# Patient Record
Sex: FEMALE | Race: BLACK OR AFRICAN AMERICAN | Employment: FULL TIME | URBAN - METROPOLITAN AREA
[De-identification: names, ages, dates, MRNs, and addresses within clinical notes are randomized per-mention and may not be internally consistent; named-entity substitution may affect disease eponyms.]

---

## 2018-07-30 ENCOUNTER — HOSPITAL ENCOUNTER (OUTPATIENT)
Dept: GENERAL RADIOLOGY | Age: 53
Discharge: HOME | End: 2018-08-01
Payer: COMMERCIAL

## 2018-07-30 DIAGNOSIS — Z12.31 VISIT FOR SCREENING MAMMOGRAM: ICD-10-CM

## 2018-07-30 PROCEDURE — 77067 SCR MAMMO BI INCL CAD: CPT

## 2019-02-07 ENCOUNTER — HOSPITAL ENCOUNTER (EMERGENCY)
Dept: HOSPITAL 93 - ER | Age: 54
Discharge: HOME | End: 2019-02-07
Payer: COMMERCIAL

## 2019-02-07 VITALS — WEIGHT: 205 LBS | HEIGHT: 64 IN | BODY MASS INDEX: 35 KG/M2

## 2019-02-07 DIAGNOSIS — S82.891A: Primary | ICD-10-CM
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
## 2020-05-14 ENCOUNTER — APPOINTMENT (OUTPATIENT)
Dept: GENERAL RADIOLOGY | Age: 55
End: 2020-05-14
Payer: COMMERCIAL

## 2020-05-14 ENCOUNTER — HOSPITAL ENCOUNTER (OUTPATIENT)
Age: 55
Setting detail: OBSERVATION
Discharge: HOME | End: 2020-05-15
Attending: EMERGENCY MEDICINE | Admitting: INTERNAL MEDICINE
Payer: COMMERCIAL

## 2020-05-14 PROBLEM — F41.9 ANXIETY: Status: ACTIVE | Noted: 2020-05-14

## 2020-05-14 PROBLEM — I10 HYPERTENSION: Status: ACTIVE | Noted: 2020-05-14

## 2020-05-14 PROBLEM — R07.9 CHEST PAIN: Status: ACTIVE | Noted: 2020-05-14

## 2020-05-14 PROBLEM — G47.33 OSA (OBSTRUCTIVE SLEEP APNEA): Status: ACTIVE | Noted: 2020-05-14

## 2020-05-14 LAB
ALBUMIN SERPL-MCNC: 3.8 G/DL (ref 3.5–5.2)
ALP BLD-CCNC: 88 U/L (ref 35–104)
ALT SERPL-CCNC: 16 U/L (ref 0–32)
ANION GAP SERPL CALCULATED.3IONS-SCNC: 9 MMOL/L (ref 7–16)
AST SERPL-CCNC: 21 U/L (ref 0–31)
BASOPHILS ABSOLUTE: 0.06 E9/L (ref 0–0.2)
BASOPHILS RELATIVE PERCENT: 1.4 % (ref 0–2)
BILIRUB SERPL-MCNC: 0.3 MG/DL (ref 0–1.2)
BUN BLDV-MCNC: 11 MG/DL (ref 6–20)
CALCIUM SERPL-MCNC: 9.1 MG/DL (ref 8.6–10.2)
CHLORIDE BLD-SCNC: 99 MMOL/L (ref 98–107)
CO2: 28 MMOL/L (ref 22–29)
CREAT SERPL-MCNC: 0.7 MG/DL (ref 0.5–1)
D DIMER: <200 NG/ML DDU
EOSINOPHILS ABSOLUTE: 0.08 E9/L (ref 0.05–0.5)
EOSINOPHILS RELATIVE PERCENT: 1.9 % (ref 0–6)
GFR AFRICAN AMERICAN: >60
GFR NON-AFRICAN AMERICAN: >60 ML/MIN/1.73
GLUCOSE BLD-MCNC: 98 MG/DL (ref 74–99)
HBA1C MFR BLD: 5.8 % (ref 4–5.6)
HCT VFR BLD CALC: 38.9 % (ref 34–48)
HEMOGLOBIN: 12.6 G/DL (ref 11.5–15.5)
IMMATURE GRANULOCYTES #: 0.01 E9/L
IMMATURE GRANULOCYTES %: 0.2 % (ref 0–5)
LYMPHOCYTES ABSOLUTE: 2.04 E9/L (ref 1.5–4)
LYMPHOCYTES RELATIVE PERCENT: 48.8 % (ref 20–42)
MCH RBC QN AUTO: 29.2 PG (ref 26–35)
MCHC RBC AUTO-ENTMCNC: 32.4 % (ref 32–34.5)
MCV RBC AUTO: 90 FL (ref 80–99.9)
MONOCYTES ABSOLUTE: 0.28 E9/L (ref 0.1–0.95)
MONOCYTES RELATIVE PERCENT: 6.7 % (ref 2–12)
NEUTROPHILS ABSOLUTE: 1.71 E9/L (ref 1.8–7.3)
NEUTROPHILS RELATIVE PERCENT: 41 % (ref 43–80)
PDW BLD-RTO: 13.2 FL (ref 11.5–15)
PLATELET # BLD: 357 E9/L (ref 130–450)
PMV BLD AUTO: 9.9 FL (ref 7–12)
POTASSIUM SERPL-SCNC: 4.4 MMOL/L (ref 3.5–5)
PRO-BNP: 66 PG/ML (ref 0–125)
RBC # BLD: 4.32 E12/L (ref 3.5–5.5)
SODIUM BLD-SCNC: 136 MMOL/L (ref 132–146)
TOTAL PROTEIN: 7.3 G/DL (ref 6.4–8.3)
TROPONIN: <0.01 NG/ML (ref 0–0.03)
TROPONIN: <0.01 NG/ML (ref 0–0.03)
WBC # BLD: 4.2 E9/L (ref 4.5–11.5)

## 2020-05-14 PROCEDURE — 85378 FIBRIN DEGRADE SEMIQUANT: CPT

## 2020-05-14 PROCEDURE — 2580000003 HC RX 258: Performed by: INTERNAL MEDICINE

## 2020-05-14 PROCEDURE — 2580000003 HC RX 258: Performed by: EMERGENCY MEDICINE

## 2020-05-14 PROCEDURE — G0378 HOSPITAL OBSERVATION PER HR: HCPCS

## 2020-05-14 PROCEDURE — 6370000000 HC RX 637 (ALT 250 FOR IP): Performed by: EMERGENCY MEDICINE

## 2020-05-14 PROCEDURE — 83880 ASSAY OF NATRIURETIC PEPTIDE: CPT

## 2020-05-14 PROCEDURE — 6360000002 HC RX W HCPCS: Performed by: EMERGENCY MEDICINE

## 2020-05-14 PROCEDURE — 36415 COLL VENOUS BLD VENIPUNCTURE: CPT

## 2020-05-14 PROCEDURE — 84484 ASSAY OF TROPONIN QUANT: CPT

## 2020-05-14 PROCEDURE — 80053 COMPREHEN METABOLIC PANEL: CPT

## 2020-05-14 PROCEDURE — 83036 HEMOGLOBIN GLYCOSYLATED A1C: CPT

## 2020-05-14 PROCEDURE — 85025 COMPLETE CBC W/AUTO DIFF WBC: CPT

## 2020-05-14 PROCEDURE — 96374 THER/PROPH/DIAG INJ IV PUSH: CPT

## 2020-05-14 PROCEDURE — 96372 THER/PROPH/DIAG INJ SC/IM: CPT

## 2020-05-14 PROCEDURE — 6360000002 HC RX W HCPCS: Performed by: INTERNAL MEDICINE

## 2020-05-14 PROCEDURE — 99285 EMERGENCY DEPT VISIT HI MDM: CPT

## 2020-05-14 PROCEDURE — 71045 X-RAY EXAM CHEST 1 VIEW: CPT

## 2020-05-14 PROCEDURE — 94761 N-INVAS EAR/PLS OXIMETRY MLT: CPT

## 2020-05-14 PROCEDURE — 93005 ELECTROCARDIOGRAM TRACING: CPT | Performed by: EMERGENCY MEDICINE

## 2020-05-14 RX ORDER — ESCITALOPRAM OXALATE 10 MG/1
10 TABLET ORAL DAILY
Status: DISCONTINUED | OUTPATIENT
Start: 2020-05-15 | End: 2020-05-15 | Stop reason: HOSPADM

## 2020-05-14 RX ORDER — VALSARTAN 320 MG/1
320 TABLET ORAL DAILY
Status: DISCONTINUED | OUTPATIENT
Start: 2020-05-15 | End: 2020-05-15 | Stop reason: HOSPADM

## 2020-05-14 RX ORDER — ACETAMINOPHEN 500 MG
1000 TABLET ORAL ONCE
Status: COMPLETED | OUTPATIENT
Start: 2020-05-14 | End: 2020-05-14

## 2020-05-14 RX ORDER — ACETAMINOPHEN 325 MG/1
650 TABLET ORAL EVERY 6 HOURS PRN
Status: DISCONTINUED | OUTPATIENT
Start: 2020-05-14 | End: 2020-05-15 | Stop reason: HOSPADM

## 2020-05-14 RX ORDER — NITROGLYCERIN 0.4 MG/1
0.4 TABLET SUBLINGUAL EVERY 5 MIN PRN
Status: DISCONTINUED | OUTPATIENT
Start: 2020-05-14 | End: 2020-05-15 | Stop reason: HOSPADM

## 2020-05-14 RX ORDER — VALSARTAN AND HYDROCHLOROTHIAZIDE 320; 12.5 MG/1; MG/1
1 TABLET, FILM COATED ORAL DAILY
COMMUNITY

## 2020-05-14 RX ORDER — HYDROCHLOROTHIAZIDE 12.5 MG/1
12.5 TABLET ORAL DAILY
Status: DISCONTINUED | OUTPATIENT
Start: 2020-05-15 | End: 2020-05-15 | Stop reason: HOSPADM

## 2020-05-14 RX ORDER — PROMETHAZINE HYDROCHLORIDE 25 MG/1
12.5 TABLET ORAL EVERY 6 HOURS PRN
Status: DISCONTINUED | OUTPATIENT
Start: 2020-05-14 | End: 2020-05-15 | Stop reason: HOSPADM

## 2020-05-14 RX ORDER — DEXTROSE MONOHYDRATE 25 G/50ML
12.5 INJECTION, SOLUTION INTRAVENOUS PRN
Status: DISCONTINUED | OUTPATIENT
Start: 2020-05-14 | End: 2020-05-15 | Stop reason: HOSPADM

## 2020-05-14 RX ORDER — ESCITALOPRAM OXALATE 10 MG/1
20 TABLET ORAL DAILY
COMMUNITY

## 2020-05-14 RX ORDER — NICOTINE POLACRILEX 4 MG
15 LOZENGE BUCCAL PRN
Status: DISCONTINUED | OUTPATIENT
Start: 2020-05-14 | End: 2020-05-15 | Stop reason: HOSPADM

## 2020-05-14 RX ORDER — ONDANSETRON 2 MG/ML
4 INJECTION INTRAMUSCULAR; INTRAVENOUS EVERY 6 HOURS PRN
Status: DISCONTINUED | OUTPATIENT
Start: 2020-05-14 | End: 2020-05-15 | Stop reason: HOSPADM

## 2020-05-14 RX ORDER — POLYETHYLENE GLYCOL 3350 17 G/17G
17 POWDER, FOR SOLUTION ORAL DAILY PRN
Status: DISCONTINUED | OUTPATIENT
Start: 2020-05-14 | End: 2020-05-15 | Stop reason: HOSPADM

## 2020-05-14 RX ORDER — ERGOCALCIFEROL 1.25 MG/1
50000 CAPSULE ORAL WEEKLY
COMMUNITY

## 2020-05-14 RX ORDER — SODIUM CHLORIDE 0.9 % (FLUSH) 0.9 %
10 SYRINGE (ML) INJECTION PRN
Status: DISCONTINUED | OUTPATIENT
Start: 2020-05-14 | End: 2020-05-15 | Stop reason: HOSPADM

## 2020-05-14 RX ORDER — SODIUM CHLORIDE 0.9 % (FLUSH) 0.9 %
10 SYRINGE (ML) INJECTION EVERY 12 HOURS SCHEDULED
Status: DISCONTINUED | OUTPATIENT
Start: 2020-05-14 | End: 2020-05-15 | Stop reason: HOSPADM

## 2020-05-14 RX ORDER — ASPIRIN 81 MG/1
81 TABLET, CHEWABLE ORAL DAILY
Status: DISCONTINUED | OUTPATIENT
Start: 2020-05-15 | End: 2020-05-15 | Stop reason: HOSPADM

## 2020-05-14 RX ORDER — KETOROLAC TROMETHAMINE 30 MG/ML
15 INJECTION, SOLUTION INTRAMUSCULAR; INTRAVENOUS ONCE
Status: COMPLETED | OUTPATIENT
Start: 2020-05-14 | End: 2020-05-14

## 2020-05-14 RX ORDER — ASPIRIN 325 MG
325 TABLET ORAL ONCE
Status: COMPLETED | OUTPATIENT
Start: 2020-05-14 | End: 2020-05-14

## 2020-05-14 RX ORDER — 0.9 % SODIUM CHLORIDE 0.9 %
500 INTRAVENOUS SOLUTION INTRAVENOUS ONCE
Status: COMPLETED | OUTPATIENT
Start: 2020-05-14 | End: 2020-05-14

## 2020-05-14 RX ORDER — DEXTROSE MONOHYDRATE 50 MG/ML
100 INJECTION, SOLUTION INTRAVENOUS PRN
Status: DISCONTINUED | OUTPATIENT
Start: 2020-05-14 | End: 2020-05-15 | Stop reason: HOSPADM

## 2020-05-14 RX ORDER — ACETAMINOPHEN 650 MG/1
650 SUPPOSITORY RECTAL EVERY 6 HOURS PRN
Status: DISCONTINUED | OUTPATIENT
Start: 2020-05-14 | End: 2020-05-15 | Stop reason: HOSPADM

## 2020-05-14 RX ORDER — VALSARTAN AND HYDROCHLOROTHIAZIDE 320; 12.5 MG/1; MG/1
1 TABLET, FILM COATED ORAL DAILY
Status: DISCONTINUED | OUTPATIENT
Start: 2020-05-15 | End: 2020-05-14 | Stop reason: CLARIF

## 2020-05-14 RX ORDER — NITROGLYCERIN 0.4 MG/1
0.4 TABLET SUBLINGUAL ONCE
Status: COMPLETED | OUTPATIENT
Start: 2020-05-14 | End: 2020-05-14

## 2020-05-14 RX ADMIN — NITROGLYCERIN 0.4 MG: 0.4 TABLET, ORALLY DISINTEGRATING SUBLINGUAL at 13:02

## 2020-05-14 RX ADMIN — KETOROLAC TROMETHAMINE 15 MG: 30 INJECTION, SOLUTION INTRAMUSCULAR at 14:44

## 2020-05-14 RX ADMIN — ASPIRIN 325 MG: 325 TABLET, FILM COATED ORAL at 17:02

## 2020-05-14 RX ADMIN — ACETAMINOPHEN 1000 MG: 500 TABLET ORAL at 17:00

## 2020-05-14 RX ADMIN — SODIUM CHLORIDE 500 ML: 9 INJECTION, SOLUTION INTRAVENOUS at 13:29

## 2020-05-14 RX ADMIN — Medication 10 ML: at 21:04

## 2020-05-14 RX ADMIN — ENOXAPARIN SODIUM 40 MG: 40 INJECTION SUBCUTANEOUS at 21:04

## 2020-05-14 ASSESSMENT — PAIN DESCRIPTION - ORIENTATION: ORIENTATION: LEFT

## 2020-05-14 ASSESSMENT — PAIN DESCRIPTION - LOCATION: LOCATION: CHEST

## 2020-05-14 ASSESSMENT — PAIN DESCRIPTION - DESCRIPTORS: DESCRIPTORS: PRESSURE;TIGHTNESS

## 2020-05-14 ASSESSMENT — PAIN SCALES - GENERAL
PAINLEVEL_OUTOF10: 0
PAINLEVEL_OUTOF10: 2
PAINLEVEL_OUTOF10: 1
PAINLEVEL_OUTOF10: 2
PAINLEVEL_OUTOF10: 8
PAINLEVEL_OUTOF10: 0

## 2020-05-14 ASSESSMENT — PAIN DESCRIPTION - PROGRESSION: CLINICAL_PROGRESSION: GRADUALLY WORSENING

## 2020-05-14 ASSESSMENT — PAIN DESCRIPTION - FREQUENCY: FREQUENCY: CONTINUOUS

## 2020-05-14 NOTE — ED PROVIDER NOTES
HPI:  5/14/20,   Time: 12:42 PM EDT       Antonio Dozier is a 48 y.o. female presenting to the ED for chest pain, beginning 16 hours ago. The complaint has been intermittent, mild in severity, and worsened by nothing. Patient states that beginning yesterday afternoon she is been having waxing waning left-sided chest pressure. She states she is had no radicular symptoms. No shortness of breath. There is no position that seems to make this worse. She is had no fevers or chills. Contrary to nursing notes she has not had chills. She states she had a chronic dry cough for months which is her seasonal allergies. She has no known sick contacts. She denies any leg swelling or leg pain. No family history of heart disease. Non-smoker she does have a family history of diabetes. She denies a personal history of hypercholesterolemia or diabetes  Or cardiac issues. She is not had any recent travel. She describes the pain as a 2 out of 10. Describes it now as a \"odd twinge\" she does report a history of hypertension. Denies pleuritic pain at this time. She denies any abdominal pain no vomiting, nausea or diarrhea. Review of Systems:   Pertinent positives and negatives are stated within HPI, all other systems reviewed and are negative.          --------------------------------------------- PAST HISTORY ---------------------------------------------  Past Medical History:  has no past medical history on file. Past Surgical History:  has no past surgical history on file. Social History:  reports that she has never smoked. She has never used smokeless tobacco. She reports current alcohol use. She reports that she does not use drugs. Family History: family history is not on file. The patients home medications have been reviewed. Allergies: Patient has no known allergies.         ---------------------------------------------------PHYSICAL EXAM--------------------------------------    Constitutional/General: Alert and oriented x3, well appearing, non toxic in NAD; overweight  Head: Normocephalic and atraumatic  Eyes: PERRL, EOMI, conjunctive normal, sclera non icteric  Mouth: Oropharynx clear, handling secretions, no trismus, no asymmetry of the posterior oropharynx or uvular edema  Neck: Supple, full ROM, non tender to palpation in the midline, no stridor, no crepitus, no meningeal signs  Respiratory: Lungs clear to auscultation bilaterally, no wheezes, rales, or rhonchi. Not in respiratory distress; no tachypnea or accessory muscle use. Cardiovascular:  Regular rate. Regular rhythm. No murmurs, gallops, or rubs. 2+ distal pulses  Chest: No chest wall tenderness  GI:  Abdomen Soft, Non tender, Non distended. +BS. No organomegaly, no palpable masses,  No rebound, guarding, or rigidity. Musculoskeletal: Moves all extremities x 4. Warm and well perfused, no clubbing, cyanosis, or edema. Capillary refill <3 seconds  Integument: skin warm and dry. No rashes. Lymphatic: no lymphadenopathy noted  Neurologic: GCS 15, no focal deficits, symmetric strength 5/5 in the upper and lower extremities bilaterally  Psychiatric: Normal Affect    -------------------------------------------------- RESULTS -------------------------------------------------  I have personally reviewed all laboratory and imaging results for this patient. Results are listed below.      LABS:  Results for orders placed or performed during the hospital encounter of 05/14/20   CBC Auto Differential   Result Value Ref Range    WBC 4.2 (L) 4.5 - 11.5 E9/L    RBC 4.32 3.50 - 5.50 E12/L    Hemoglobin 12.6 11.5 - 15.5 g/dL    Hematocrit 38.9 34.0 - 48.0 %    MCV 90.0 80.0 - 99.9 fL    MCH 29.2 26.0 - 35.0 pg    MCHC 32.4 32.0 - 34.5 %    RDW 13.2 11.5 - 15.0 fL    Platelets 034 849 - 128 E9/L    MPV 9.9 7.0 - 12.0 fL    Neutrophils % 41.0 (L) 43.0 - 80.0 %    Immature Granulocytes % 0.2 0.0 - Suspicious  +1       []   Highly Suspicious  +2    EK point: No ST deviation but LBBB, LVH repolarization changes (ex:digoxin);               2 points: ST deviation not due to LBBB, LVH or digoxin         [x]   Normal  0       []   Nonspecific Repolarization Disturbance  +1       []   Significant ST Depression  +2    AGE       []   <45  0       [x]   45-64  +1       []    >65  +2    RISK FACTORS:  1. HTN    2. Hypercholesterolemia    3. DM     4. Cigarette smoking (current or cessation < 3 mos)    5. Positive family history  (parent or sibling with CVD before age 72). 6. Obesity (BMI >30kg/m2)         []   No Risk factors Known  0       [x]   1-2 Risk Factors  +1       []   >3 Risk Factors or History of Atherosclerotic Disease  +2      INITIAL TROPONIN       [x]   < Normal Limit   0       []   1-3 x Normal Limit   +1       []   >3 x Normal Limit   +2     -----------------------------------------------------------------------------------------------------------------  SCORE TOTAL:  3 POINTS     Low Score          (0-3 Points), risk of MACE of 0.9-1.7% (discuss d/c home with f/u)  Mod Score          (4-6 Points), risk of MACE of 12-16.6% (discuss admission for further testing)  High Score         (7-10 Points), risk of MACE of 50-65% (Admit ALL as they are candidates for early invasive measures)  ------------------------- NURSING NOTES AND VITALS REVIEWED ---------------------------   The nursing notes within the ED encounter and vital signs as below have been reviewed by myself. BP (!) 144/86   Pulse 58   Temp 98.7 °F (37.1 °C) (Oral)   Resp 16   Ht 5' 4\" (1.626 m)   Wt 220 lb (99.8 kg)   SpO2 98%   BMI 37.76 kg/m²   Oxygen Saturation Interpretation: Normal    The patients available past medical records and past encounters were reviewed.         ------------------------------ ED COURSE/MEDICAL DECISION MAKING----------------------  Medications   aspirin tablet 325 mg (has no administration in time range)   acetaminophen (TYLENOL) tablet 1,000 mg (has no administration in time range)   nitroGLYCERIN (NITROSTAT) SL tablet 0.4 mg (0.4 mg Sublingual Given 5/14/20 1302)   0.9 % sodium chloride bolus (0 mLs Intravenous Stopped 5/14/20 1450)   ketorolac (TORADOL) injection 15 mg (15 mg Intravenous Given 5/14/20 1444)         ED COURSE:       Medical Decision Making:   Patient is a 57-year-old female that reports since yesterday afternoon she is been having left-sided chest pain. It does wax and wanes at times but is about a 2 out of 10 pain and has not fully gone away. She denies shortness of breath. No pleuritic pain. She denies any new cough or fevers. She does have a history of seasonal allergies. She denies cardiac history other than hypertension. Have EKG chest x-ray lab work including d-dimer troponin. We will try 1 dose of sublingual nitroglycerin to see if this relieves the pressure. Differential diagnosis includes ACS, MI, dysrhythmia, angina, anxiety, PE, GERD, musculoskeletal pain. Patient had a CBC that was normal chemistry that was normal troponin was negative BNP was normal d-dimer was negative chest x-ray was normal as well. She did have an EKG that showed normal sinus rhythm at 69 bpm no signs of any ST changes no ST elevations or depressions. However, she does have T wave inversions in 3 aVF V3 V4 and V5. I did call her PCP and attempt get an old EKG, as we did not have one here in the emergency room in our files. They did not have an old EKG for me to compare this to. Therefore, I did speak with Dr. Alison Childress from cardiology. He would prefer the patient stay for observation and stress test.  The hospitalist will admit the patient to their service. Patient is pain-free upon admission and was given an aspirin prior to admission. This patient has remained hemodynamically stable during their ED course. Re-Evaluations:             Re-evaluation.   Patients symptoms are

## 2020-05-14 NOTE — H&P
mouth daily   Yes Historical Provider, MD   vitamin D (ERGOCALCIFEROL) 1.25 MG (37587 UT) CAPS capsule Take 50,000 Units by mouth once a week Sundays   Yes Historical Provider, MD       Allergies:    Patient has no known allergies. Social History:    reports that she has never smoked. She has never used smokeless tobacco. She reports current alcohol use. She reports that she does not use drugs. Family History:   family history includes Colon Cancer in her mother; Diabetes in her father and mother; High Blood Pressure in her mother. PHYSICAL EXAM:  Vitals:  /66   Pulse 71   Temp 97.9 °F (36.6 °C) (Oral)   Resp 16   Ht 5' 4\" (1.626 m)   Wt 220 lb (99.8 kg)   SpO2 99%   BMI 37.76 kg/m²     General Appearance: alert and oriented to person, place and time and in no acute distress  Skin: warm and dry  Head: normocephalic and atraumatic  Eyes: pupils equal, round, and reactive to light, extraocular eye movements intact, conjunctivae normal  Neck: neck supple and non tender without mass   Pulmonary/Chest: clear to auscultation bilaterally- no wheezes, rales or rhonchi, normal air movement, no respiratory distress  Cardiovascular: normal rate, normal S1 and S2 and no carotid bruits  Abdomen: Obese, soft, non-tender, non-distended, normal bowel sounds, no masses or organomegaly  Extremities: no cyanosis, no clubbing and no edema  Neurologic: no cranial nerve deficit and speech normal        LABS:  Recent Labs     05/14/20  1315      K 4.4   CL 99   CO2 28   BUN 11   CREATININE 0.7   GLUCOSE 98   CALCIUM 9.1       Recent Labs     05/14/20  1315   WBC 4.2*   RBC 4.32   HGB 12.6   HCT 38.9   MCV 90.0   MCH 29.2   MCHC 32.4   RDW 13.2      MPV 9.9       No results for input(s): POCGLU in the last 72 hours.     CBC with Differential:    Lab Results   Component Value Date    WBC 4.2 05/14/2020    RBC 4.32 05/14/2020    HGB 12.6 05/14/2020    HCT 38.9 05/14/2020     05/14/2020    MCV 90.0

## 2020-05-15 ENCOUNTER — APPOINTMENT (OUTPATIENT)
Dept: NON INVASIVE DIAGNOSTICS | Age: 55
End: 2020-05-15
Payer: COMMERCIAL

## 2020-05-15 ENCOUNTER — APPOINTMENT (OUTPATIENT)
Dept: NUCLEAR MEDICINE | Age: 55
End: 2020-05-15
Payer: COMMERCIAL

## 2020-05-15 VITALS
WEIGHT: 222.19 LBS | OXYGEN SATURATION: 97 % | TEMPERATURE: 97.6 F | RESPIRATION RATE: 16 BRPM | HEART RATE: 63 BPM | BODY MASS INDEX: 37.93 KG/M2 | HEIGHT: 64 IN | DIASTOLIC BLOOD PRESSURE: 86 MMHG | SYSTOLIC BLOOD PRESSURE: 142 MMHG

## 2020-05-15 LAB
CHOLESTEROL, TOTAL: 162 MG/DL (ref 0–199)
EKG ATRIAL RATE: 69 BPM
EKG P AXIS: 49 DEGREES
EKG P-R INTERVAL: 158 MS
EKG Q-T INTERVAL: 394 MS
EKG QRS DURATION: 72 MS
EKG QTC CALCULATION (BAZETT): 422 MS
EKG R AXIS: 4 DEGREES
EKG T AXIS: -25 DEGREES
EKG VENTRICULAR RATE: 69 BPM
HDLC SERPL-MCNC: 47 MG/DL
LDL CHOLESTEROL CALCULATED: 100 MG/DL (ref 0–99)
LV EF: 70 %
LVEF MODALITY: NORMAL
TRIGL SERPL-MCNC: 77 MG/DL (ref 0–149)
TROPONIN: <0.01 NG/ML (ref 0–0.03)
TSH SERPL DL<=0.05 MIU/L-ACNC: 2.2 UIU/ML (ref 0.27–4.2)
VLDLC SERPL CALC-MCNC: 15 MG/DL

## 2020-05-15 PROCEDURE — 84443 ASSAY THYROID STIM HORMONE: CPT

## 2020-05-15 PROCEDURE — 36415 COLL VENOUS BLD VENIPUNCTURE: CPT

## 2020-05-15 PROCEDURE — 78452 HT MUSCLE IMAGE SPECT MULT: CPT

## 2020-05-15 PROCEDURE — 93016 CV STRESS TEST SUPVJ ONLY: CPT | Performed by: INTERNAL MEDICINE

## 2020-05-15 PROCEDURE — APPSS60 APP SPLIT SHARED TIME 46-60 MINUTES: Performed by: PHYSICIAN ASSISTANT

## 2020-05-15 PROCEDURE — 84484 ASSAY OF TROPONIN QUANT: CPT

## 2020-05-15 PROCEDURE — G0378 HOSPITAL OBSERVATION PER HR: HCPCS

## 2020-05-15 PROCEDURE — 93018 CV STRESS TEST I&R ONLY: CPT | Performed by: INTERNAL MEDICINE

## 2020-05-15 PROCEDURE — A9500 TC99M SESTAMIBI: HCPCS | Performed by: RADIOLOGY

## 2020-05-15 PROCEDURE — 6370000000 HC RX 637 (ALT 250 FOR IP): Performed by: INTERNAL MEDICINE

## 2020-05-15 PROCEDURE — 93017 CV STRESS TEST TRACING ONLY: CPT

## 2020-05-15 PROCEDURE — 2580000003 HC RX 258: Performed by: INTERNAL MEDICINE

## 2020-05-15 PROCEDURE — 93010 ELECTROCARDIOGRAM REPORT: CPT | Performed by: INTERNAL MEDICINE

## 2020-05-15 PROCEDURE — 3430000000 HC RX DIAGNOSTIC RADIOPHARMACEUTICAL: Performed by: RADIOLOGY

## 2020-05-15 PROCEDURE — 99239 HOSP IP/OBS DSCHRG MGMT >30: CPT | Performed by: FAMILY MEDICINE

## 2020-05-15 PROCEDURE — 99244 OFF/OP CNSLTJ NEW/EST MOD 40: CPT | Performed by: INTERNAL MEDICINE

## 2020-05-15 PROCEDURE — 80061 LIPID PANEL: CPT

## 2020-05-15 RX ADMIN — ESCITALOPRAM OXALATE 10 MG: 10 TABLET ORAL at 14:08

## 2020-05-15 RX ADMIN — HYDROCHLOROTHIAZIDE 12.5 MG: 12.5 TABLET ORAL at 14:08

## 2020-05-15 RX ADMIN — Medication 10 MILLICURIE: at 10:50

## 2020-05-15 RX ADMIN — Medication 30 MILLICURIE: at 12:13

## 2020-05-15 RX ADMIN — Medication 10 ML: at 09:24

## 2020-05-15 RX ADMIN — ASPIRIN 81 MG 81 MG: 81 TABLET ORAL at 14:08

## 2020-05-15 RX ADMIN — VALSARTAN 320 MG: 320 TABLET, FILM COATED ORAL at 14:08

## 2020-05-15 ASSESSMENT — PAIN SCALES - GENERAL: PAINLEVEL_OUTOF10: 0

## 2020-05-15 NOTE — PATIENT CARE CONFERENCE
[facility] Quality Flow/Interdisciplinary Rounds Progress Note        Quality Flow Rounds held on May 15, 2020    Disciplines Attending:  Bedside Nurse, ,  and Nursing Unit Leadership    Salena Edmondson was admitted on 5/14/2020 12:14 PM    Anticipated Discharge Date:  Expected Discharge Date: 05/17/20    Disposition:    Lorenzo Score:  Lorenzo Scale Score: 22    Readmission Score:         Discussed patient goal for the day, patient clinical progression, and barriers to discharge.   The following Goal(s) of the Day/Commitment(s) have been identified:  stress today       Jud Model  May 15, 2020

## 2020-05-15 NOTE — CONSULTS
Inpatient Cardiology Consultation      Reason for Consult:  Chest pain     Consulting Physician: Dr Bulmaro العراقي     Requesting Physician:  Dr Willam Marie     Date of Consultation: 5/15/2020    HISTORY OF PRESENT ILLNESS:   Ms Ashwini Callahan is a 48 to [facility] [employer] female who is not previously known to [address] [place] [address]. She just moved to the area approximately 3 years ago and does not follow with a Primary Care Provider yet here. She previously followed with [facility] and  [facility]. She has a PMH of HTN, anxiety/depression, Pat-en-Y surgery 2004. [facility] 5/14/2020 12:09 for chest pain. VS 97.8-68-16-99%RA-110/60  WBC 4.2, H&H 12.6/38.9,    Na 136, K 4.4, Cl 99, CO2 28, BUN/SCr 11/0.7, Glucose 98   Troponin < 0.01, ProBNP 66  Ddimer < 200   CXR negative for acute findings   EKG SR 69, non specific T wave changes, anterior leads, inferior leads (no previous EKG to compare to)     She states her chest pain started Wednesday 5/13/2020 in the evening. Describes as tightness that was constant. It did not change with exertion. She denies SOB or palpitations with it. She was able to sleep through the night Wednesday but when she got up Thursday morning 5/14/2020, the tightness was still there and worse. She locates it mid sternal with radiation to L side of face and L scapula. She denies change in pain with exertion. She decided to come to the ED since the tightness was not changing. She was given SL NG x1 and 15mg Toradol in the ED with relief of the pain. She is not sure which one helped the pain dissipate, but it took about 30 minutes after she was given the medicine for it to get better. She was admitted to a telemetry monitoring floor. Overnight she states that she had 3 occurrences of the chest tightness, the most recent being at 345am. It lasted about 15 minutes and then dissipated. She was not given any medications for the pain at this time. She is a life long non smoker.  She thinks she had a stress test years ago and it was negative (no records available). Currently she denies any chest pain, SOB, palpitations, lightheadedness/dizziness, orthopnea, PND. She has been having diarrhea for the last 2 days with associated sore throat and dry cough, which she attributed to allergies. Please note: past medical records were reviewed per electronic medical record (EMR) - see detailed reports under Past Medical/ Surgical History. Past Medical History:    1. Anxiety/depression   2. HTN  3. Previous EKG 2012 [facility] with T wave abnormalities noted (not available to view)   4. H/o c section, Pat-en-Y surgery 2004    Medications Prior to admit:  Prior to Admission medications    Medication Sig Start Date End Date Taking?  Authorizing Provider   escitalopram (LEXAPRO) 10 MG tablet Take 20 mg by mouth daily    Yes Historical Provider, MD   valsartan-hydrochlorothiazide (DIOVAN-HCT) 320-12.5 MG per tablet Take 1 tablet by mouth daily   Yes Historical Provider, MD   vitamin D (ERGOCALCIFEROL) 1.25 MG (08210 UT) CAPS capsule Take 50,000 Units by mouth once a week Sundays   Yes Historical Provider, MD       Current Medications:    Current Facility-Administered Medications: escitalopram (LEXAPRO) tablet 10 mg, 10 mg, Oral, Daily  sodium chloride flush 0.9 % injection 10 mL, 10 mL, Intravenous, 2 times per day  sodium chloride flush 0.9 % injection 10 mL, 10 mL, Intravenous, PRN  acetaminophen (TYLENOL) tablet 650 mg, 650 mg, Oral, Q6H PRN **OR** acetaminophen (TYLENOL) suppository 650 mg, 650 mg, Rectal, Q6H PRN  polyethylene glycol (GLYCOLAX) packet 17 g, 17 g, Oral, Daily PRN  promethazine (PHENERGAN) tablet 12.5 mg, 12.5 mg, Oral, Q6H PRN **OR** ondansetron (ZOFRAN) injection 4 mg, 4 mg, Intravenous, Q6H PRN  enoxaparin (LOVENOX) injection 40 mg, 40 mg, Subcutaneous, Daily  nitroGLYCERIN (NITROSTAT) SL tablet 0.4 mg, 0.4 mg, Sublingual, Q5 Min PRN  aspirin chewable tablet 81 mg, 81 mg, Oral, Daily  glucose (GLUTOSE) 40 % oral gel 15 g, 15 g, Oral, PRN  dextrose 50 % IV solution, 12.5 g, Intravenous, PRN  glucagon (rDNA) injection 1 mg, 1 mg, Intramuscular, PRN  dextrose 5 % solution, 100 mL/hr, Intravenous, PRN  valsartan (DIOVAN) tablet 320 mg, 320 mg, Oral, Daily **AND** hydrochlorothiazide (HYDRODIURIL) tablet 12.5 mg, 12.5 mg, Oral, Daily    Allergies:  Patient has no known allergies. Social History: Activity Level:1 story home; denies NAVAS at baseline 1  Work Hx: NA   Tobacco Hx: Denies   Alcohol Hx: Socially   Drug Dx: Denies   Caffeine Intake: Coffee, teas, soda, water   Code status: Full code     Family History: Jorje Chang age 67, HTN, DM   Father, living age 68, HTN, DM  Son, no known cardiac conditions     REVIEW OF SYSTEMS:     · Constitutional: Denies fevers, chills, night sweats, and fatigue  · HEENT: Denies headaches, nose bleeds, and blurred vision,oral pain, abscess or lesion. · Musculoskeletal: Denies falls, pain to BLE with ambulation and edema to BLE. · Neurological: Denies dizziness and lightheadedness, numbness and tingling  · Cardiovascular: Admits chest pain (see HPI) Denies palpitations, and feelings of heart racing. · Respiratory: Denies orthopnea and PND  · Gastrointestinal: Admits diarrhea (see HPI) Denies heartburn, nausea/vomiting,and constipation, black/bloody, and tarry stools. · Genitourinary: Denies dysuria and hematuria  · Hematologic: Denies excessive bruising or bleeding  · Lymphatic: Denies lumps and bumps to neck, axilla, breast, and groin    PHYSICAL EXAM:   /72   Pulse 61   Temp 97.6 °F (36.4 °C) (Oral)   Resp 16   Ht 5' 4\" (1.626 m)   Wt 222 lb 3 oz (100.8 kg)   SpO2 97%   BMI 38.14 kg/m²   CONST:  Well developed,  female who appears her stated age.  Awake, alert, cooperative, no apparent distress  HEENT:   Head- Normocephalic, atraumatic   Eyes- Conjunctivae pink, anicteric  Throat- Oral mucosa pink and moist  Neck-  No stridor, trachea no lower extremity edema  Neurologic: No focal motor deficits apparent, normal mood and affect    Telemetry: Sinus rhythm  EKG: Sinus rhythm 69 bpm.  Normal axis normal intervals. T wave inversions V3 through V6. Nonspecific inferior T wave changes. No prior for comparison. Impression:   1. Chest pain and borderline EKG changes. Troponin negative, no evidence of ACS. 2. Hypertension  3. Comorbid disease: Obesity (BMI 38.2 kg/m²), history of Pat-en-Y gastric bypass, anxiety/depression    Recommendations:  Given risk factors and EKG changes, ischemic evaluation is warranted.  Exercise nuclear stress test today   Aggressive risk factor modification   If no evidence of significant ischemia, can be discharged from my standpoint and follow-up as an outpatient    Thank you for the consultation. Please do not hesitate to call with questions.     Kalpana Simental MD  [facility]) Cardiology

## 2020-05-15 NOTE — PROCEDURES
Exercise Nuclear Stress Test    Date: 5/15/2020    Indication: Chest pain    Description of procedure:    Protocol: Exercise stress SPECT myocardial perfusion imaging, Bartolo protocol    Baseline EKG: SB 58 bpm, normal axis/intervals. Nonspecific T wave changes diffusely. Baseline BP: 129/73 mmHg    Patient exercised for a total of 6 minutes, achieving a peak heart rate of 151 bpm which is 90% of the predicted maximum. Peak BP was 213/78 mmHg. Total workload achieved was 6.9 METs. Patient reported no chest pain. Stress EKG showed no evidence of ischemia, and no arrhythmias were noted. Radiotracer was injected at peak exercise. Impression:  1. No evidence of ischemia on exercise stress EKG. 2. Hypertensive blood pressure response to exercise. 3. Normal heart rate recovery. 4. Average functional capacity. 5. Nuclear images to be reported separately.     Hector Mcdaniel MD  [facility]) Cardiology

## 2020-05-19 ENCOUNTER — TELEPHONE (OUTPATIENT)
Dept: CARDIOLOGY CLINIC | Age: 55
End: 2020-05-19

## 2020-10-26 ENCOUNTER — HOSPITAL ENCOUNTER (OUTPATIENT)
Dept: GENERAL RADIOLOGY | Age: 55
Discharge: HOME | End: 2020-10-28
Payer: COMMERCIAL

## 2020-10-26 PROCEDURE — 77063 BREAST TOMOSYNTHESIS BI: CPT

## 2023-04-24 ENCOUNTER — HOSPITAL ENCOUNTER (EMERGENCY)
Age: 58
Discharge: HOME | End: 2023-04-24
Attending: EMERGENCY MEDICINE

## 2023-04-24 ENCOUNTER — APPOINTMENT (OUTPATIENT)
Dept: CT IMAGING | Age: 58
End: 2023-04-24

## 2023-04-24 VITALS
OXYGEN SATURATION: 98 % | SYSTOLIC BLOOD PRESSURE: 196 MMHG | TEMPERATURE: 98 F | DIASTOLIC BLOOD PRESSURE: 94 MMHG | HEART RATE: 79 BPM | RESPIRATION RATE: 15 BRPM

## 2023-04-24 DIAGNOSIS — I10 ESSENTIAL HYPERTENSION: ICD-10-CM

## 2023-04-24 DIAGNOSIS — M54.2 NECK PAIN: Primary | ICD-10-CM

## 2023-04-24 LAB
ALBUMIN SERPL-MCNC: 4.2 G/DL (ref 3.5–5.2)
ALP SERPL-CCNC: 125 U/L (ref 35–104)
ALT SERPL-CCNC: 30 U/L (ref 0–32)
ANION GAP SERPL CALCULATED.3IONS-SCNC: 14 MMOL/L (ref 7–16)
AST SERPL-CCNC: 40 U/L (ref 0–31)
BASOPHILS # BLD: 0.03 E9/L (ref 0–0.2)
BASOPHILS NFR BLD: 0.7 % (ref 0–2)
BILIRUB SERPL-MCNC: 0.3 MG/DL (ref 0–1.2)
BUN SERPL-MCNC: 12 MG/DL (ref 6–20)
CALCIUM SERPL-MCNC: 9.9 MG/DL (ref 8.6–10.2)
CHLORIDE SERPL-SCNC: 98 MMOL/L (ref 98–107)
CO2 SERPL-SCNC: 24 MMOL/L (ref 22–29)
CREAT SERPL-MCNC: 0.6 MG/DL (ref 0.5–1)
EOSINOPHIL # BLD: 0.05 E9/L (ref 0.05–0.5)
EOSINOPHIL NFR BLD: 1.1 % (ref 0–6)
ERYTHROCYTE [DISTWIDTH] IN BLOOD BY AUTOMATED COUNT: 13.4 FL (ref 11.5–15)
GLUCOSE SERPL-MCNC: 97 MG/DL (ref 74–99)
HCT VFR BLD AUTO: 42.2 % (ref 34–48)
HGB BLD-MCNC: 13.5 G/DL (ref 11.5–15.5)
IMM GRANULOCYTES # BLD: 0.01 E9/L
IMM GRANULOCYTES NFR BLD: 0.2 % (ref 0–5)
LYMPHOCYTES # BLD: 1.77 E9/L (ref 1.5–4)
LYMPHOCYTES NFR BLD: 40 % (ref 20–42)
MCH RBC QN AUTO: 28.8 PG (ref 26–35)
MCHC RBC AUTO-ENTMCNC: 32 % (ref 32–34.5)
MCV RBC AUTO: 90.2 FL (ref 80–99.9)
MONOCYTES # BLD: 0.35 E9/L (ref 0.1–0.95)
MONOCYTES NFR BLD: 7.9 % (ref 2–12)
NEUTROPHILS # BLD: 2.21 E9/L (ref 1.8–7.3)
NEUTS SEG NFR BLD: 50.1 % (ref 43–80)
PLATELET # BLD AUTO: 381 E9/L (ref 130–450)
PMV BLD AUTO: 10 FL (ref 7–12)
POTASSIUM SERPL-SCNC: 4.1 MMOL/L (ref 3.5–5)
PROT SERPL-MCNC: 8 G/DL (ref 6.4–8.3)
RBC # BLD AUTO: 4.68 E12/L (ref 3.5–5.5)
SODIUM SERPL-SCNC: 136 MMOL/L (ref 132–146)
WBC # BLD: 4.4 E9/L (ref 4.5–11.5)

## 2023-04-24 PROCEDURE — 99285 EMERGENCY DEPT VISIT HI MDM: CPT

## 2023-04-24 PROCEDURE — 6360000004 HC RX CONTRAST MEDICATION: Performed by: RADIOLOGY

## 2023-04-24 PROCEDURE — 72125 CT NECK SPINE W/O DYE: CPT

## 2023-04-24 PROCEDURE — 93005 ELECTROCARDIOGRAM TRACING: CPT | Performed by: PHYSICIAN ASSISTANT

## 2023-04-24 PROCEDURE — 70450 CT HEAD/BRAIN W/O DYE: CPT

## 2023-04-24 PROCEDURE — 85025 COMPLETE CBC W/AUTO DIFF WBC: CPT

## 2023-04-24 PROCEDURE — 96374 THER/PROPH/DIAG INJ IV PUSH: CPT

## 2023-04-24 PROCEDURE — 80053 COMPREHEN METABOLIC PANEL: CPT

## 2023-04-24 PROCEDURE — 70498 CT ANGIOGRAPHY NECK: CPT

## 2023-04-24 PROCEDURE — 2500000003 HC RX 250 WO HCPCS: Performed by: EMERGENCY MEDICINE

## 2023-04-24 RX ORDER — TIZANIDINE 4 MG/1
4 TABLET ORAL 3 TIMES DAILY PRN
Qty: 30 TABLET | Refills: 0 | Status: SHIPPED | OUTPATIENT
Start: 2023-04-24

## 2023-04-24 RX ORDER — ENALAPRILAT 2.5 MG/2ML
1.25 INJECTION INTRAVENOUS ONCE
Status: COMPLETED | OUTPATIENT
Start: 2023-04-24 | End: 2023-04-24

## 2023-04-24 RX ADMIN — IOPAMIDOL 60 ML: 755 INJECTION, SOLUTION INTRAVENOUS at 16:25

## 2023-04-24 RX ADMIN — ENALAPRILAT 1.25 MG: 1.25 INJECTION INTRAVENOUS at 17:02

## 2023-04-24 ASSESSMENT — ENCOUNTER SYMPTOMS
VOMITING: 0
NAUSEA: 0
CHEST TIGHTNESS: 0
DIARRHEA: 0
SINUS PRESSURE: 0
ABDOMINAL DISTENTION: 0
BACK PAIN: 0
SORE THROAT: 0
COUGH: 0
SHORTNESS OF BREATH: 0
COLOR CHANGE: 0
SINUS PAIN: 0
CONSTIPATION: 0
ABDOMINAL PAIN: 0

## 2023-04-24 NOTE — ED NOTES
Reviewed discharge instructions with patient, discussed medications and addressed all patient and family questions/concerns. Pt verbalizes understanding.        Alissa Spears RN  04/24/23 0167

## 2023-04-24 NOTE — ED PROVIDER NOTES
The history is provided by the patient and medical records. 56-year-old female presents emergency department with complaint of left-sided neck pain going on for the last 3 to 4 days that radiates down her left arm. Denies any chest pain shortness breath denies any diaphoresis any nausea vomiting fever chills cough congestion runny nose no other acute complaints. Has some slight pain at this time. She states he also gets some slight numbness that happens that goes down that arm. Does not have any other symptoms now. Only complains of left upper arm pain at this time. Otherwise no other acute complaints. Review of Systems   Constitutional:  Negative for chills, fatigue and fever. HENT:  Negative for congestion, sinus pressure, sinus pain and sore throat. Respiratory:  Negative for cough, chest tightness and shortness of breath. Cardiovascular:  Negative for chest pain and leg swelling. Gastrointestinal:  Negative for abdominal distention, abdominal pain, constipation, diarrhea, nausea and vomiting. Endocrine: Negative for polyuria. Genitourinary:  Negative for difficulty urinating, dysuria, frequency, hematuria, urgency, vaginal bleeding and vaginal discharge. Musculoskeletal:  Positive for neck pain. Negative for arthralgias and back pain. Skin:  Negative for color change and pallor. Neurological:  Negative for dizziness and weakness. Physical Exam  Vitals and nursing note reviewed. Constitutional:       Appearance: Normal appearance. She is normal weight. HENT:      Head: Normocephalic and atraumatic. Eyes:      Conjunctiva/sclera: Conjunctivae normal.   Cardiovascular:      Rate and Rhythm: Normal rate and regular rhythm. Pulses: Normal pulses. Heart sounds: Normal heart sounds. No murmur heard. No gallop. Pulmonary:      Effort: Pulmonary effort is normal. No respiratory distress. Breath sounds: Normal breath sounds. No wheezing or rales.

## 2023-04-25 LAB
EKG ATRIAL RATE: 71 BPM
EKG P AXIS: 32 DEGREES
EKG P-R INTERVAL: 146 MS
EKG Q-T INTERVAL: 392 MS
EKG QRS DURATION: 76 MS
EKG QTC CALCULATION (BAZETT): 425 MS
EKG R AXIS: 7 DEGREES
EKG T AXIS: -5 DEGREES
EKG VENTRICULAR RATE: 71 BPM

## 2023-04-25 PROCEDURE — 93010 ELECTROCARDIOGRAM REPORT: CPT | Performed by: INTERNAL MEDICINE

## 2023-05-15 ENCOUNTER — TELEPHONE (OUTPATIENT)
Dept: PHYSICAL THERAPY | Age: 58
End: 2023-05-15

## 2023-08-22 ENCOUNTER — HOSPITAL ENCOUNTER (OUTPATIENT)
Dept: GENERAL RADIOLOGY | Age: 58
Discharge: HOME | End: 2023-08-24
Payer: COMMERCIAL

## 2023-08-22 VITALS — HEIGHT: 64 IN | WEIGHT: 230 LBS | BODY MASS INDEX: 39.27 KG/M2

## 2023-08-22 DIAGNOSIS — Z12.31 VISIT FOR SCREENING MAMMOGRAM: ICD-10-CM

## 2023-08-22 PROCEDURE — 77063 BREAST TOMOSYNTHESIS BI: CPT

## 2024-01-19 ENCOUNTER — HOSPITAL ENCOUNTER (OUTPATIENT)
Dept: CT IMAGING | Age: 59
Discharge: HOME | End: 2024-01-19
Payer: COMMERCIAL

## 2024-01-19 DIAGNOSIS — K76.0 FATTY LIVER: ICD-10-CM

## 2024-01-19 DIAGNOSIS — R20.2 PARESTHESIA: ICD-10-CM

## 2024-01-19 DIAGNOSIS — R10.812 LEFT UPPER QUADRANT ABDOMINAL TENDERNESS, REBOUND TENDERNESS PRESENCE NOT SPECIFIED: ICD-10-CM

## 2024-01-19 PROCEDURE — 74176 CT ABD & PELVIS W/O CONTRAST: CPT

## 2024-10-07 ENCOUNTER — HOSPITAL ENCOUNTER (OUTPATIENT)
Dept: GENERAL RADIOLOGY | Age: 59
Discharge: HOME | End: 2024-10-09
Payer: COMMERCIAL

## 2024-10-07 VITALS — BODY MASS INDEX: 39.27 KG/M2 | WEIGHT: 230 LBS | HEIGHT: 64 IN

## 2024-10-07 DIAGNOSIS — Z12.31 ENCOUNTER FOR SCREENING MAMMOGRAM FOR BREAST CANCER: ICD-10-CM

## 2024-10-07 PROCEDURE — 77063 BREAST TOMOSYNTHESIS BI: CPT

## 2024-11-03 ENCOUNTER — APPOINTMENT (OUTPATIENT)
Dept: GENERAL RADIOLOGY | Age: 59
End: 2024-11-03
Payer: COMMERCIAL

## 2024-11-03 ENCOUNTER — APPOINTMENT (OUTPATIENT)
Dept: CT IMAGING | Age: 59
End: 2024-11-03
Payer: COMMERCIAL

## 2024-11-03 ENCOUNTER — HOSPITAL ENCOUNTER (OUTPATIENT)
Age: 59
Setting detail: OBSERVATION
Discharge: HOME | End: 2024-11-05
Attending: EMERGENCY MEDICINE | Admitting: INTERNAL MEDICINE
Payer: COMMERCIAL

## 2024-11-03 DIAGNOSIS — S82.891D CLOSED FRACTURE OF RIGHT ANKLE WITH ROUTINE HEALING, SUBSEQUENT ENCOUNTER: ICD-10-CM

## 2024-11-03 DIAGNOSIS — S82.891A CLOSED FRACTURE OF RIGHT ANKLE, INITIAL ENCOUNTER: Primary | ICD-10-CM

## 2024-11-03 PROBLEM — S82.899A ANKLE FRACTURE: Status: ACTIVE | Noted: 2024-11-03

## 2024-11-03 PROBLEM — R26.2 AMBULATORY DYSFUNCTION: Status: ACTIVE | Noted: 2024-11-03

## 2024-11-03 PROBLEM — F32.A DEPRESSION: Status: ACTIVE | Noted: 2024-11-03

## 2024-11-03 LAB
ABO + RH BLD: NORMAL
ALBUMIN SERPL-MCNC: 3.7 G/DL (ref 3.5–5.2)
ALP SERPL-CCNC: 108 U/L (ref 35–104)
ALT SERPL-CCNC: 19 U/L (ref 0–32)
ANION GAP SERPL CALCULATED.3IONS-SCNC: 10 MMOL/L (ref 7–16)
ARM BAND NUMBER: NORMAL
AST SERPL-CCNC: 22 U/L (ref 0–31)
BASOPHILS # BLD: 0.04 K/UL (ref 0–0.2)
BASOPHILS NFR BLD: 1 % (ref 0–2)
BILIRUB SERPL-MCNC: 0.3 MG/DL (ref 0–1.2)
BLOOD BANK SAMPLE EXPIRATION: NORMAL
BLOOD GROUP ANTIBODIES SERPL: NEGATIVE
BUN SERPL-MCNC: 10 MG/DL (ref 6–20)
CALCIUM SERPL-MCNC: 8.6 MG/DL (ref 8.6–10.2)
CHLORIDE SERPL-SCNC: 107 MMOL/L (ref 98–107)
CO2 SERPL-SCNC: 25 MMOL/L (ref 22–29)
CREAT SERPL-MCNC: 0.6 MG/DL (ref 0.5–1)
EOSINOPHIL # BLD: 0.05 K/UL (ref 0.05–0.5)
EOSINOPHILS RELATIVE PERCENT: 1 % (ref 0–6)
ERYTHROCYTE [DISTWIDTH] IN BLOOD BY AUTOMATED COUNT: 13.2 % (ref 11.5–15)
GFR, ESTIMATED: >90 ML/MIN/1.73M2
GLUCOSE SERPL-MCNC: 84 MG/DL (ref 74–99)
HCT VFR BLD AUTO: 40.4 % (ref 34–48)
HGB BLD-MCNC: 13 G/DL (ref 11.5–15.5)
IMM GRANULOCYTES # BLD AUTO: <0.03 K/UL (ref 0–0.58)
IMM GRANULOCYTES NFR BLD: 0 % (ref 0–5)
INR PPP: 1.2
LYMPHOCYTES NFR BLD: 1.91 K/UL (ref 1.5–4)
LYMPHOCYTES RELATIVE PERCENT: 29 % (ref 20–42)
MCH RBC QN AUTO: 29.6 PG (ref 26–35)
MCHC RBC AUTO-ENTMCNC: 32.2 G/DL (ref 32–34.5)
MCV RBC AUTO: 92 FL (ref 80–99.9)
MONOCYTES NFR BLD: 0.44 K/UL (ref 0.1–0.95)
MONOCYTES NFR BLD: 7 % (ref 2–12)
NEUTROPHILS NFR BLD: 62 % (ref 43–80)
NEUTS SEG NFR BLD: 4.09 K/UL (ref 1.8–7.3)
PARTIAL THROMBOPLASTIN TIME: 31.7 SEC (ref 24.5–35.1)
PLATELET # BLD AUTO: 385 K/UL (ref 130–450)
PMV BLD AUTO: 10 FL (ref 7–12)
POTASSIUM SERPL-SCNC: 3.7 MMOL/L (ref 3.5–5)
PROT SERPL-MCNC: 6.4 G/DL (ref 6.4–8.3)
PROTHROMBIN TIME: 13.1 SEC (ref 9.3–12.4)
RBC # BLD AUTO: 4.39 M/UL (ref 3.5–5.5)
SODIUM SERPL-SCNC: 142 MMOL/L (ref 132–146)
WBC OTHER # BLD: 6.6 K/UL (ref 4.5–11.5)

## 2024-11-03 PROCEDURE — 99285 EMERGENCY DEPT VISIT HI MDM: CPT

## 2024-11-03 PROCEDURE — 85610 PROTHROMBIN TIME: CPT

## 2024-11-03 PROCEDURE — 96375 TX/PRO/DX INJ NEW DRUG ADDON: CPT

## 2024-11-03 PROCEDURE — 80053 COMPREHEN METABOLIC PANEL: CPT

## 2024-11-03 PROCEDURE — 73610 X-RAY EXAM OF ANKLE: CPT

## 2024-11-03 PROCEDURE — 6370000000 HC RX 637 (ALT 250 FOR IP): Performed by: EMERGENCY MEDICINE

## 2024-11-03 PROCEDURE — 2500000003 HC RX 250 WO HCPCS

## 2024-11-03 PROCEDURE — 73700 CT LOWER EXTREMITY W/O DYE: CPT

## 2024-11-03 PROCEDURE — 2580000003 HC RX 258: Performed by: INTERNAL MEDICINE

## 2024-11-03 PROCEDURE — 73590 X-RAY EXAM OF LOWER LEG: CPT

## 2024-11-03 PROCEDURE — 6370000000 HC RX 637 (ALT 250 FOR IP): Performed by: NURSE PRACTITIONER

## 2024-11-03 PROCEDURE — G0378 HOSPITAL OBSERVATION PER HR: HCPCS

## 2024-11-03 PROCEDURE — 99223 1ST HOSP IP/OBS HIGH 75: CPT | Performed by: INTERNAL MEDICINE

## 2024-11-03 PROCEDURE — 85025 COMPLETE CBC W/AUTO DIFF WBC: CPT

## 2024-11-03 PROCEDURE — 86850 RBC ANTIBODY SCREEN: CPT

## 2024-11-03 PROCEDURE — 86901 BLOOD TYPING SEROLOGIC RH(D): CPT

## 2024-11-03 PROCEDURE — 85730 THROMBOPLASTIN TIME PARTIAL: CPT

## 2024-11-03 PROCEDURE — 6370000000 HC RX 637 (ALT 250 FOR IP): Performed by: INTERNAL MEDICINE

## 2024-11-03 PROCEDURE — 6360000002 HC RX W HCPCS: Performed by: INTERNAL MEDICINE

## 2024-11-03 PROCEDURE — 96372 THER/PROPH/DIAG INJ SC/IM: CPT

## 2024-11-03 PROCEDURE — APPSS60 APP SPLIT SHARED TIME 46-60 MINUTES: Performed by: NURSE PRACTITIONER

## 2024-11-03 PROCEDURE — 6360000002 HC RX W HCPCS

## 2024-11-03 PROCEDURE — 96374 THER/PROPH/DIAG INJ IV PUSH: CPT

## 2024-11-03 PROCEDURE — 86900 BLOOD TYPING SEROLOGIC ABO: CPT

## 2024-11-03 RX ORDER — OXYCODONE AND ACETAMINOPHEN 5; 325 MG/1; MG/1
1 TABLET ORAL EVERY 6 HOURS PRN
Qty: 10 TABLET | Refills: 0 | Status: SHIPPED | OUTPATIENT
Start: 2024-11-03 | End: 2024-11-07 | Stop reason: SDUPTHER

## 2024-11-03 RX ORDER — SEMAGLUTIDE 0.5 MG/.5ML
0.5 INJECTION, SOLUTION SUBCUTANEOUS
COMMUNITY
Start: 2024-10-28

## 2024-11-03 RX ORDER — TRAMADOL HYDROCHLORIDE 50 MG/1
50 TABLET ORAL EVERY 6 HOURS PRN
Status: DISCONTINUED | OUTPATIENT
Start: 2024-11-03 | End: 2024-11-05 | Stop reason: HOSPADM

## 2024-11-03 RX ORDER — POTASSIUM CHLORIDE 7.45 MG/ML
10 INJECTION INTRAVENOUS PRN
Status: DISCONTINUED | OUTPATIENT
Start: 2024-11-03 | End: 2024-11-05 | Stop reason: HOSPADM

## 2024-11-03 RX ORDER — HYDROCODONE BITARTRATE AND ACETAMINOPHEN 5; 325 MG/1; MG/1
1 TABLET ORAL ONCE
Status: COMPLETED | OUTPATIENT
Start: 2024-11-03 | End: 2024-11-03

## 2024-11-03 RX ORDER — DIPHENHYDRAMINE HCL 25 MG
25 TABLET ORAL EVERY 6 HOURS PRN
Status: DISCONTINUED | OUTPATIENT
Start: 2024-11-03 | End: 2024-11-05 | Stop reason: HOSPADM

## 2024-11-03 RX ORDER — HYDROCHLOROTHIAZIDE 12.5 MG/1
12.5 TABLET ORAL DAILY
Status: DISCONTINUED | OUTPATIENT
Start: 2024-11-03 | End: 2024-11-05 | Stop reason: HOSPADM

## 2024-11-03 RX ORDER — SODIUM CHLORIDE 9 MG/ML
INJECTION, SOLUTION INTRAVENOUS PRN
Status: DISCONTINUED | OUTPATIENT
Start: 2024-11-03 | End: 2024-11-05 | Stop reason: HOSPADM

## 2024-11-03 RX ORDER — DUPILUMAB 300 MG/2ML
300 INJECTION, SOLUTION SUBCUTANEOUS
Status: ON HOLD | COMMUNITY
Start: 2024-09-09 | End: 2024-11-05 | Stop reason: HOSPADM

## 2024-11-03 RX ORDER — VALSARTAN 320 MG/1
320 TABLET ORAL DAILY
Status: DISCONTINUED | OUTPATIENT
Start: 2024-11-03 | End: 2024-11-05 | Stop reason: HOSPADM

## 2024-11-03 RX ORDER — ONDANSETRON 4 MG/1
4 TABLET, ORALLY DISINTEGRATING ORAL EVERY 8 HOURS PRN
Status: DISCONTINUED | OUTPATIENT
Start: 2024-11-03 | End: 2024-11-05 | Stop reason: HOSPADM

## 2024-11-03 RX ORDER — ENOXAPARIN SODIUM 100 MG/ML
30 INJECTION SUBCUTANEOUS 2 TIMES DAILY
Status: DISCONTINUED | OUTPATIENT
Start: 2024-11-03 | End: 2024-11-04

## 2024-11-03 RX ORDER — VALSARTAN 320 MG/1
320 TABLET ORAL DAILY
Status: ON HOLD | COMMUNITY
Start: 2024-09-09 | End: 2024-11-05 | Stop reason: HOSPADM

## 2024-11-03 RX ORDER — ACETAMINOPHEN 650 MG/1
650 SUPPOSITORY RECTAL EVERY 6 HOURS PRN
Status: DISCONTINUED | OUTPATIENT
Start: 2024-11-03 | End: 2024-11-05 | Stop reason: HOSPADM

## 2024-11-03 RX ORDER — ERGOCALCIFEROL 1.25 MG/1
50000 CAPSULE, LIQUID FILLED ORAL WEEKLY
Status: DISCONTINUED | OUTPATIENT
Start: 2024-11-03 | End: 2024-11-05 | Stop reason: HOSPADM

## 2024-11-03 RX ORDER — FENTANYL CITRATE 50 UG/ML
100 INJECTION, SOLUTION INTRAMUSCULAR; INTRAVENOUS ONCE
Status: COMPLETED | OUTPATIENT
Start: 2024-11-03 | End: 2024-11-03

## 2024-11-03 RX ORDER — ASPIRIN 81 MG/1
162 TABLET ORAL DAILY
Qty: 28 TABLET | Refills: 0 | Status: SHIPPED | OUTPATIENT
Start: 2024-11-03 | End: 2024-11-17

## 2024-11-03 RX ORDER — SODIUM CHLORIDE 0.9 % (FLUSH) 0.9 %
5-40 SYRINGE (ML) INJECTION PRN
Status: DISCONTINUED | OUTPATIENT
Start: 2024-11-03 | End: 2024-11-05 | Stop reason: HOSPADM

## 2024-11-03 RX ORDER — ACETAMINOPHEN 325 MG/1
650 TABLET ORAL ONCE
Status: COMPLETED | OUTPATIENT
Start: 2024-11-03 | End: 2024-11-03

## 2024-11-03 RX ORDER — MIDAZOLAM HYDROCHLORIDE 2 MG/2ML
2 INJECTION, SOLUTION INTRAMUSCULAR; INTRAVENOUS ONCE
Status: COMPLETED | OUTPATIENT
Start: 2024-11-03 | End: 2024-11-03

## 2024-11-03 RX ORDER — HYDROCODONE BITARTRATE AND ACETAMINOPHEN 10; 325 MG/1; MG/1
1 TABLET ORAL EVERY 6 HOURS PRN
Status: DISCONTINUED | OUTPATIENT
Start: 2024-11-03 | End: 2024-11-05

## 2024-11-03 RX ORDER — ACETAMINOPHEN 325 MG/1
650 TABLET ORAL EVERY 6 HOURS PRN
Status: DISCONTINUED | OUTPATIENT
Start: 2024-11-03 | End: 2024-11-05 | Stop reason: HOSPADM

## 2024-11-03 RX ORDER — SODIUM CHLORIDE 0.9 % (FLUSH) 0.9 %
5-40 SYRINGE (ML) INJECTION EVERY 12 HOURS SCHEDULED
Status: DISCONTINUED | OUTPATIENT
Start: 2024-11-03 | End: 2024-11-05 | Stop reason: HOSPADM

## 2024-11-03 RX ORDER — POLYETHYLENE GLYCOL 3350 17 G/17G
17 POWDER, FOR SOLUTION ORAL DAILY PRN
Status: DISCONTINUED | OUTPATIENT
Start: 2024-11-03 | End: 2024-11-05 | Stop reason: HOSPADM

## 2024-11-03 RX ORDER — MAGNESIUM SULFATE IN WATER 40 MG/ML
2000 INJECTION, SOLUTION INTRAVENOUS PRN
Status: DISCONTINUED | OUTPATIENT
Start: 2024-11-03 | End: 2024-11-05 | Stop reason: HOSPADM

## 2024-11-03 RX ORDER — LIDOCAINE HYDROCHLORIDE 10 MG/ML
20 INJECTION, SOLUTION EPIDURAL; INFILTRATION; INTRACAUDAL; PERINEURAL ONCE
Status: COMPLETED | OUTPATIENT
Start: 2024-11-03 | End: 2024-11-03

## 2024-11-03 RX ORDER — VALSARTAN AND HYDROCHLOROTHIAZIDE 320; 12.5 MG/1; MG/1
1 TABLET, FILM COATED ORAL DAILY
Status: DISCONTINUED | OUTPATIENT
Start: 2024-11-03 | End: 2024-11-03

## 2024-11-03 RX ORDER — POTASSIUM CHLORIDE 1500 MG/1
40 TABLET, EXTENDED RELEASE ORAL PRN
Status: DISCONTINUED | OUTPATIENT
Start: 2024-11-03 | End: 2024-11-05 | Stop reason: HOSPADM

## 2024-11-03 RX ORDER — ONDANSETRON 2 MG/ML
4 INJECTION INTRAMUSCULAR; INTRAVENOUS EVERY 6 HOURS PRN
Status: DISCONTINUED | OUTPATIENT
Start: 2024-11-03 | End: 2024-11-05 | Stop reason: HOSPADM

## 2024-11-03 RX ADMIN — SODIUM CHLORIDE, PRESERVATIVE FREE 10 ML: 5 INJECTION INTRAVENOUS at 20:30

## 2024-11-03 RX ADMIN — HYDROCODONE BITARTRATE AND ACETAMINOPHEN 1 TABLET: 5; 325 TABLET ORAL at 01:27

## 2024-11-03 RX ADMIN — ACETAMINOPHEN 650 MG: 325 TABLET ORAL at 06:49

## 2024-11-03 RX ADMIN — LIDOCAINE HYDROCHLORIDE 20 ML: 10 INJECTION, SOLUTION EPIDURAL; INFILTRATION; INTRACAUDAL; PERINEURAL at 04:00

## 2024-11-03 RX ADMIN — MIDAZOLAM 2 MG: 1 INJECTION INTRAMUSCULAR; INTRAVENOUS at 03:48

## 2024-11-03 RX ADMIN — HYDROCODONE BITARTRATE AND ACETAMINOPHEN 1 TABLET: 10; 325 TABLET ORAL at 17:34

## 2024-11-03 RX ADMIN — HYDROCODONE BITARTRATE AND ACETAMINOPHEN 1 TABLET: 10; 325 TABLET ORAL at 23:39

## 2024-11-03 RX ADMIN — SODIUM CHLORIDE, PRESERVATIVE FREE 10 ML: 5 INJECTION INTRAVENOUS at 11:39

## 2024-11-03 RX ADMIN — DIPHENHYDRAMINE HYDROCHLORIDE 25 MG: 25 TABLET ORAL at 23:39

## 2024-11-03 RX ADMIN — HYDROCODONE BITARTRATE AND ACETAMINOPHEN 1 TABLET: 10; 325 TABLET ORAL at 11:31

## 2024-11-03 RX ADMIN — FENTANYL CITRATE 100 MCG: 50 INJECTION INTRAMUSCULAR; INTRAVENOUS at 03:49

## 2024-11-03 RX ADMIN — ENOXAPARIN SODIUM 30 MG: 100 INJECTION SUBCUTANEOUS at 11:30

## 2024-11-03 RX ADMIN — VALSARTAN 320 MG: 80 TABLET, FILM COATED ORAL at 11:31

## 2024-11-03 ASSESSMENT — PAIN DESCRIPTION - ORIENTATION
ORIENTATION: RIGHT

## 2024-11-03 ASSESSMENT — PAIN SCALES - GENERAL
PAINLEVEL_OUTOF10: 9
PAINLEVEL_OUTOF10: 7
PAINLEVEL_OUTOF10: 8
PAINLEVEL_OUTOF10: 4

## 2024-11-03 ASSESSMENT — PAIN - FUNCTIONAL ASSESSMENT
PAIN_FUNCTIONAL_ASSESSMENT: PREVENTS OR INTERFERES SOME ACTIVE ACTIVITIES AND ADLS
PAIN_FUNCTIONAL_ASSESSMENT: PREVENTS OR INTERFERES SOME ACTIVE ACTIVITIES AND ADLS
PAIN_FUNCTIONAL_ASSESSMENT: 0-10

## 2024-11-03 ASSESSMENT — PAIN DESCRIPTION - LOCATION
LOCATION: ANKLE
LOCATION: ANKLE;LEG
LOCATION: ANKLE
LOCATION: LEG
LOCATION: ANKLE;LEG

## 2024-11-03 ASSESSMENT — PAIN DESCRIPTION - DESCRIPTORS
DESCRIPTORS: SHARP
DESCRIPTORS: DISCOMFORT;THROBBING;TIGHTNESS

## 2024-11-03 NOTE — CONSULTS
Department of Orthopedic Surgery  Resident Consult Note          Reason for Consult: Mechanical fall, right ankle pain    HISTORY OF PRESENT ILLNESS:       Patient is a 59 y.o. female who presents with right ankle pain following mechanical fall that occurred earlier this evening.  Patient states that she was walking down 2 steps when she missed a step and lost her balance twisting her right ankle.  Patient denies hitting her head or loss consciousness.  Patient states she had immediate right ankle pain and inability ambulate so she contacted EMS and was transported to [facility] for further evaluation.  Denies numbness/tingling/paresthesias.  Denies any other orthopedic complaints at this time.      Patient denies any cardiac history, denies any blood thinners.  Community ambulator without the use of assistive device.    Past Medical History:    No past medical history on file.  Past Surgical History:    No past surgical history on file.  Current Medications:   No current facility-administered medications for this encounter.  Allergies:  Patient has no known allergies.    Social History:   TOBACCO:   reports that she has never smoked. She has never used smokeless tobacco.  ETOH:   reports current alcohol use.  DRUGS:   reports no history of drug use.  ACTIVITIES OF DAILY LIVING:    OCCUPATION:    Family History:       Problem Relation Age of Onset    High Blood Pressure Mother     Colon Cancer Mother 60    Diabetes Mother     Diabetes Father     Breast Cancer Cousin 30       REVIEW OF SYSTEMS:  CONSTITUTIONAL:  negative for  fevers, chills  EYES:  negative for blurred vision, visual disturbance  HEENT:  negative for  hearing loss, voice change  RESPIRATORY:  negative for  dyspnea, wheezing  CARDIOVASCULAR:  negative for  chest pain, palpitations  GASTROINTESTINAL:  negative for nausea, vomiting  GENITOURINARY:  negative for frequency, urinary incontinence  HEMATOLOGIC/LYMPHATIC:  negative for bleeding and

## 2024-11-03 NOTE — ED PROVIDER NOTES
Shared LOUIE-ED Attending Visit.  CC: No         [facility] EMERGENCY DEPARTMENT  ED  Encounter Note  Admit Date/RoomTime: 11/3/2024 12:34 AM  ED Room:   NAME: Angelica Rey  : 1965  MRN: [id number]  PCP: Roya Sánchez, PABLO - NP    CHIEF COMPLAINT     Ankle Pain ((Missed a step, twisted R ankle, -LOC, -hit head))    HISTORY OF PRESENT ILLNESS        Angelica Rey is a 59 y.o. female who presents to the ED by ambulance for right ankle pain.  Missed a step twisted ankle and immediately could not ambulate no other injuries.  Not anticoagulated.  Did not strike her head.  REVIEW OF SYSTEMS     Pertinent positives and negatives are stated within HPI, all other systems reviewed and are negative.    Past Medical History:  has no past medical history on file.  Surgical History:  has no past surgical history on file.  Social History:  reports that she has never smoked. She has never used smokeless tobacco. She reports current alcohol use. She reports that she does not use drugs.  Family History: family history includes Breast Cancer (age of onset: 30) in her cousin; Colon Cancer (age of onset: 60) in her mother; Diabetes in her father and mother; High Blood Pressure in her mother.   Allergies: Patient has no known allergies.  CURRENT MEDICATIONS       Previous Medications    ESCITALOPRAM (LEXAPRO) 10 MG TABLET    Take 20 mg by mouth daily     TIZANIDINE (ZANAFLEX) 4 MG TABLET    Take 1 tablet by mouth 3 times daily as needed (neck pain)    VALSARTAN-HYDROCHLOROTHIAZIDE (DIOVAN-HCT) 320-12.5 MG PER TABLET    Take 1 tablet by mouth daily    VITAMIN D (ERGOCALCIFEROL) 1.25 MG (30070 UT) CAPS CAPSULE    Take 50,000 Units by mouth once a week Sundays       SCREENINGS               CIWA Assessment  BP: (!) 124/92  Pulse: 96       PHYSICAL EXAM   Oxygen Saturation Interpretation: Normal on room air analysis.        ED Triage Vitals [24 0034]   BP Systolic BP Percentile

## 2024-11-03 NOTE — DISCHARGE INSTRUCTIONS
[facility] Department of Orthopedic Surgery  [address] [facility] [address]    Dr. Juanpablo Maier, DO         MD Dr. Juanpablo Elise MD Frank Ansevin, PA-C Sara Zatchok, PA-C Tyler Tsangaris PA-C      Orthopaedics Discharge Instructions   Weight bearing Status - Non-weight bearing - on right lower Extremity  Pain Medication   Contact Office for Medication Refill- [phone]  Office can refill pain medications no sooner than every 7 days  If patient discharging to facility then pain control will be continued per facility physician  Ice to operative/injured site for 15-30 minutes of each hour for next 5 days    Recommend that you continue to ice the area 2-3 times per day after this   Elevate operative/injured limb on 2 pillows at home  Goal is to have limb above the heart if able  Continue DVT Prophylaxis (blood clot prevention) as prescribed: Aspirin 81 mg twice daily      Follow up in office in approximately 2 weeks. Your first follow up appointment is often with one of our physician assistants.     Call the office at [phone] if having any concerns.     Watch for these significant complications.  Call physician if they or any other problems occur:  Fever over 101°, redness, swelling or warmth at the operative site  Unrelieved nausea    Foul smelling or cloudy drainage at the operative site   Unrelieved pain    Blood soaked dressing. (Some oozing may be normal)     Numb, pale, blue, cold or tingling extremity          It is the Department of Orthopaedic Trauma's standard of practice that providers will de-escalate (wean) all patients from narcotic (opioid) medications during the post-operative period.   We provide multimodal pain control, but opioid medications are tapered in all of our patients.  If patient requires referral to pain management for prolonged taper from opioid pain medication, we will facilitate this process.        Weight bearing is an important component

## 2024-11-03 NOTE — H&P
[facility] Hospitalist Group History and Physical      CHIEF COMPLAINT: Right ankle pain    History of Present Illness: This is a 59-year-old female with a past medical history of HTN  depression who presented to the ED right ankle pain after a mechanical fall.  Patient states she was walking down steps when she missed a step and lost her balance injuring her right ankle.  Patient states she fell but denies hitting her head or any loss of consciousness.  Patient states she felt immediate pain to her right ankle and was unable to ambulate.  She called EMS who transported her to the hospital for further evaluation.  X-ray of the right ankle showed closed right trimalleolar ankle fracture. She was seen in the ED by orthopedic surgery.  Patient's ankle was splinted in the ED and orthopedic surgery released patient for discharge.  Patient was unable to ambulate on crutches so she will be admitted for further management.    Informant(s) for H&P: Patient    REVIEW OF SYSTEMS:  A comprehensive review of systems was negative except for: what is in the HPI      PMH:  Past Medical History:   Diagnosis Date    Hypertension        Surgical History:  No past surgical history on file.    Medications Prior to Admission:    Prior to Admission medications    Medication Sig Start Date End Date Taking? Authorizing Provider   oxyCODONE-acetaminophen (PERCOCET) 5-325 MG per tablet Take 1 tablet by mouth every 6 hours as needed for Pain for up to 3 days. Max Daily Amount: 4 tablets 11/3/24 11/6/24 Yes Ronald Kumari MD   aspirin 81 MG EC tablet Take 2 tablets by mouth daily for 14 days 11/3/24 11/17/24 Yes Ronald Kumari MD   tiZANidine (ZANAFLEX) 4 MG tablet Take 1 tablet by mouth 3 times daily as needed (neck pain) 4/24/23   Win Rodriguez MD   escitalopram (LEXAPRO) 10 MG tablet Take 20 mg by mouth daily     Provider, MD Temitope   valsartan-hydrochlorothiazide (DIOVAN-HCT) 320-12.5 MG per tablet Take 1 tablet by

## 2024-11-04 ENCOUNTER — APPOINTMENT (OUTPATIENT)
Dept: GENERAL RADIOLOGY | Age: 59
End: 2024-11-04
Payer: COMMERCIAL

## 2024-11-04 ENCOUNTER — ANESTHESIA (OUTPATIENT)
Dept: OPERATING ROOM | Age: 59
End: 2024-11-04
Payer: COMMERCIAL

## 2024-11-04 ENCOUNTER — ANESTHESIA EVENT (OUTPATIENT)
Dept: OPERATING ROOM | Age: 59
End: 2024-11-04
Payer: COMMERCIAL

## 2024-11-04 LAB
ANION GAP SERPL CALCULATED.3IONS-SCNC: 12 MMOL/L (ref 7–16)
BASOPHILS # BLD: 0.05 K/UL (ref 0–0.2)
BASOPHILS NFR BLD: 1 % (ref 0–2)
BUN SERPL-MCNC: 10 MG/DL (ref 6–20)
CALCIUM SERPL-MCNC: 9.3 MG/DL (ref 8.6–10.2)
CHLORIDE SERPL-SCNC: 102 MMOL/L (ref 98–107)
CO2 SERPL-SCNC: 26 MMOL/L (ref 22–29)
CREAT SERPL-MCNC: 0.7 MG/DL (ref 0.5–1)
EOSINOPHIL # BLD: 0.16 K/UL (ref 0.05–0.5)
EOSINOPHILS RELATIVE PERCENT: 3 % (ref 0–6)
ERYTHROCYTE [DISTWIDTH] IN BLOOD BY AUTOMATED COUNT: 13.5 % (ref 11.5–15)
GFR, ESTIMATED: >90 ML/MIN/1.73M2
GLUCOSE SERPL-MCNC: 111 MG/DL (ref 74–99)
HCT VFR BLD AUTO: 40.2 % (ref 34–48)
HGB BLD-MCNC: 12.7 G/DL (ref 11.5–15.5)
IMM GRANULOCYTES # BLD AUTO: <0.03 K/UL (ref 0–0.58)
IMM GRANULOCYTES NFR BLD: 0 % (ref 0–5)
LYMPHOCYTES NFR BLD: 2.44 K/UL (ref 1.5–4)
LYMPHOCYTES RELATIVE PERCENT: 47 % (ref 20–42)
MCH RBC QN AUTO: 29.1 PG (ref 26–35)
MCHC RBC AUTO-ENTMCNC: 31.6 G/DL (ref 32–34.5)
MCV RBC AUTO: 92.2 FL (ref 80–99.9)
MONOCYTES NFR BLD: 0.5 K/UL (ref 0.1–0.95)
MONOCYTES NFR BLD: 10 % (ref 2–12)
NEUTROPHILS NFR BLD: 39 % (ref 43–80)
NEUTS SEG NFR BLD: 2.03 K/UL (ref 1.8–7.3)
PLATELET # BLD AUTO: 379 K/UL (ref 130–450)
PMV BLD AUTO: 10.1 FL (ref 7–12)
POTASSIUM SERPL-SCNC: 3.6 MMOL/L (ref 3.5–5)
RBC # BLD AUTO: 4.36 M/UL (ref 3.5–5.5)
SODIUM SERPL-SCNC: 140 MMOL/L (ref 132–146)
WBC OTHER # BLD: 5.2 K/UL (ref 4.5–11.5)

## 2024-11-04 PROCEDURE — 2720000010 HC SURG SUPPLY STERILE: Performed by: ORTHOPAEDIC SURGERY

## 2024-11-04 PROCEDURE — 73610 X-RAY EXAM OF ANKLE: CPT

## 2024-11-04 PROCEDURE — 2500000003 HC RX 250 WO HCPCS

## 2024-11-04 PROCEDURE — 2580000003 HC RX 258: Performed by: INTERNAL MEDICINE

## 2024-11-04 PROCEDURE — 7100000001 HC PACU RECOVERY - ADDTL 15 MIN: Performed by: ORTHOPAEDIC SURGERY

## 2024-11-04 PROCEDURE — 6360000002 HC RX W HCPCS

## 2024-11-04 PROCEDURE — 97535 SELF CARE MNGMENT TRAINING: CPT

## 2024-11-04 PROCEDURE — 2709999900 HC NON-CHARGEABLE SUPPLY: Performed by: ORTHOPAEDIC SURGERY

## 2024-11-04 PROCEDURE — 3600000007 HC SURGERY HYBRID BASE: Performed by: ORTHOPAEDIC SURGERY

## 2024-11-04 PROCEDURE — 6370000000 HC RX 637 (ALT 250 FOR IP): Performed by: NURSE PRACTITIONER

## 2024-11-04 PROCEDURE — C1713 ANCHOR/SCREW BN/BN,TIS/BN: HCPCS | Performed by: ORTHOPAEDIC SURGERY

## 2024-11-04 PROCEDURE — 36415 COLL VENOUS BLD VENIPUNCTURE: CPT

## 2024-11-04 PROCEDURE — 6360000002 HC RX W HCPCS: Performed by: ORTHOPAEDIC SURGERY

## 2024-11-04 PROCEDURE — 80048 BASIC METABOLIC PNL TOTAL CA: CPT

## 2024-11-04 PROCEDURE — 97165 OT EVAL LOW COMPLEX 30 MIN: CPT

## 2024-11-04 PROCEDURE — 3700000001 HC ADD 15 MINUTES (ANESTHESIA): Performed by: ORTHOPAEDIC SURGERY

## 2024-11-04 PROCEDURE — 2500000003 HC RX 250 WO HCPCS: Performed by: ANESTHESIOLOGY

## 2024-11-04 PROCEDURE — 99232 SBSQ HOSP IP/OBS MODERATE 35: CPT | Performed by: INTERNAL MEDICINE

## 2024-11-04 PROCEDURE — 7100000000 HC PACU RECOVERY - FIRST 15 MIN: Performed by: ORTHOPAEDIC SURGERY

## 2024-11-04 PROCEDURE — G0378 HOSPITAL OBSERVATION PER HR: HCPCS

## 2024-11-04 PROCEDURE — 3600000017 HC SURGERY HYBRID ADDL 15MIN: Performed by: ORTHOPAEDIC SURGERY

## 2024-11-04 PROCEDURE — 6370000000 HC RX 637 (ALT 250 FOR IP)

## 2024-11-04 PROCEDURE — 3700000000 HC ANESTHESIA ATTENDED CARE: Performed by: ORTHOPAEDIC SURGERY

## 2024-11-04 PROCEDURE — 6370000000 HC RX 637 (ALT 250 FOR IP): Performed by: INTERNAL MEDICINE

## 2024-11-04 PROCEDURE — 2580000003 HC RX 258

## 2024-11-04 PROCEDURE — 85025 COMPLETE CBC W/AUTO DIFF WBC: CPT

## 2024-11-04 DEVICE — PLATE BNE L93MM 8 H S STL 1/3 TBLR LOK COMPR W/ CLLR FOR: Type: IMPLANTABLE DEVICE | Site: ANKLE | Status: FUNCTIONAL

## 2024-11-04 DEVICE — SCREW BNE L16MM DIA3.5MM CORT S STL ST NONCANNULATED LOK: Type: IMPLANTABLE DEVICE | Site: ANKLE | Status: FUNCTIONAL

## 2024-11-04 DEVICE — SCREW BNE L14MM DIA4MM CANC S STL ST CANN NONLOCKING FULL: Type: IMPLANTABLE DEVICE | Site: ANKLE | Status: FUNCTIONAL

## 2024-11-04 DEVICE — SCREW BNE L12MM DIA3.5MM CORT S STL ST NONCANNULATED LOK: Type: IMPLANTABLE DEVICE | Site: ANKLE | Status: FUNCTIONAL

## 2024-11-04 DEVICE — SCREW BNE L14MM DIA3.5MM CORT S STL ST NONCANNULATED LOK: Type: IMPLANTABLE DEVICE | Site: ANKLE | Status: FUNCTIONAL

## 2024-11-04 DEVICE — SCREW BNE L16MM DIA4MM CANC S STL ST CANN NONLOCKING FULL: Type: IMPLANTABLE DEVICE | Site: ANKLE | Status: FUNCTIONAL

## 2024-11-04 RX ORDER — ONDANSETRON 4 MG/1
4 TABLET, ORALLY DISINTEGRATING ORAL EVERY 8 HOURS PRN
Status: DISCONTINUED | OUTPATIENT
Start: 2024-11-04 | End: 2024-11-05 | Stop reason: HOSPADM

## 2024-11-04 RX ORDER — MIDAZOLAM HYDROCHLORIDE 1 MG/ML
INJECTION, SOLUTION INTRAMUSCULAR; INTRAVENOUS
Status: DISCONTINUED | OUTPATIENT
Start: 2024-11-04 | End: 2024-11-04 | Stop reason: SDUPTHER

## 2024-11-04 RX ORDER — LIDOCAINE HYDROCHLORIDE 20 MG/ML
INJECTION, SOLUTION INTRAVENOUS
Status: DISCONTINUED | OUTPATIENT
Start: 2024-11-04 | End: 2024-11-04 | Stop reason: SDUPTHER

## 2024-11-04 RX ORDER — SODIUM CHLORIDE 9 MG/ML
INJECTION, SOLUTION INTRAVENOUS PRN
Status: DISCONTINUED | OUTPATIENT
Start: 2024-11-04 | End: 2024-11-04 | Stop reason: HOSPADM

## 2024-11-04 RX ORDER — HYDROCODONE BITARTRATE AND ACETAMINOPHEN 10; 325 MG/1; MG/1
1 TABLET ORAL EVERY 8 HOURS PRN
Qty: 6 TABLET | Refills: 0 | Status: SHIPPED | OUTPATIENT
Start: 2024-11-04 | End: 2024-11-05 | Stop reason: HOSPADM

## 2024-11-04 RX ORDER — ONDANSETRON 2 MG/ML
INJECTION INTRAMUSCULAR; INTRAVENOUS
Status: DISCONTINUED | OUTPATIENT
Start: 2024-11-04 | End: 2024-11-04 | Stop reason: SDUPTHER

## 2024-11-04 RX ORDER — BUPIVACAINE HYDROCHLORIDE 5 MG/ML
INJECTION, SOLUTION EPIDURAL; INTRACAUDAL PRN
Status: DISCONTINUED | OUTPATIENT
Start: 2024-11-04 | End: 2024-11-04 | Stop reason: ALTCHOICE

## 2024-11-04 RX ORDER — ASPIRIN 81 MG/1
81 TABLET ORAL 2 TIMES DAILY
Status: DISCONTINUED | OUTPATIENT
Start: 2024-11-04 | End: 2024-11-05 | Stop reason: HOSPADM

## 2024-11-04 RX ORDER — DEXAMETHASONE SODIUM PHOSPHATE 10 MG/ML
INJECTION INTRAMUSCULAR; INTRAVENOUS
Status: DISCONTINUED | OUTPATIENT
Start: 2024-11-04 | End: 2024-11-04 | Stop reason: SDUPTHER

## 2024-11-04 RX ORDER — PROPOFOL 10 MG/ML
INJECTION, EMULSION INTRAVENOUS
Status: DISCONTINUED | OUTPATIENT
Start: 2024-11-04 | End: 2024-11-04 | Stop reason: SDUPTHER

## 2024-11-04 RX ORDER — FENTANYL CITRATE 50 UG/ML
INJECTION, SOLUTION INTRAMUSCULAR; INTRAVENOUS
Status: DISCONTINUED | OUTPATIENT
Start: 2024-11-04 | End: 2024-11-04 | Stop reason: SDUPTHER

## 2024-11-04 RX ORDER — ONDANSETRON 2 MG/ML
4 INJECTION INTRAMUSCULAR; INTRAVENOUS EVERY 6 HOURS PRN
Status: DISCONTINUED | OUTPATIENT
Start: 2024-11-04 | End: 2024-11-05 | Stop reason: HOSPADM

## 2024-11-04 RX ORDER — SODIUM CHLORIDE 0.9 % (FLUSH) 0.9 %
5-40 SYRINGE (ML) INJECTION EVERY 12 HOURS SCHEDULED
Status: DISCONTINUED | OUTPATIENT
Start: 2024-11-04 | End: 2024-11-04 | Stop reason: SDUPTHER

## 2024-11-04 RX ORDER — HYDROMORPHONE HYDROCHLORIDE 1 MG/ML
0.5 INJECTION, SOLUTION INTRAMUSCULAR; INTRAVENOUS; SUBCUTANEOUS EVERY 5 MIN PRN
Status: DISCONTINUED | OUTPATIENT
Start: 2024-11-04 | End: 2024-11-04 | Stop reason: HOSPADM

## 2024-11-04 RX ORDER — SODIUM CHLORIDE 0.9 % (FLUSH) 0.9 %
5-40 SYRINGE (ML) INJECTION EVERY 12 HOURS SCHEDULED
Status: DISCONTINUED | OUTPATIENT
Start: 2024-11-04 | End: 2024-11-04 | Stop reason: HOSPADM

## 2024-11-04 RX ORDER — ROCURONIUM BROMIDE 10 MG/ML
INJECTION, SOLUTION INTRAVENOUS
Status: DISCONTINUED | OUTPATIENT
Start: 2024-11-04 | End: 2024-11-04 | Stop reason: SDUPTHER

## 2024-11-04 RX ORDER — SODIUM CHLORIDE 0.9 % (FLUSH) 0.9 %
5-40 SYRINGE (ML) INJECTION PRN
Status: DISCONTINUED | OUTPATIENT
Start: 2024-11-04 | End: 2024-11-04 | Stop reason: HOSPADM

## 2024-11-04 RX ORDER — SODIUM CHLORIDE 9 MG/ML
INJECTION, SOLUTION INTRAVENOUS PRN
Status: DISCONTINUED | OUTPATIENT
Start: 2024-11-04 | End: 2024-11-05 | Stop reason: HOSPADM

## 2024-11-04 RX ORDER — SODIUM CHLORIDE 0.9 % (FLUSH) 0.9 %
5-40 SYRINGE (ML) INJECTION PRN
Status: DISCONTINUED | OUTPATIENT
Start: 2024-11-04 | End: 2024-11-04 | Stop reason: SDUPTHER

## 2024-11-04 RX ORDER — ONDANSETRON 2 MG/ML
4 INJECTION INTRAMUSCULAR; INTRAVENOUS
Status: DISCONTINUED | OUTPATIENT
Start: 2024-11-04 | End: 2024-11-04 | Stop reason: HOSPADM

## 2024-11-04 RX ORDER — PHENYLEPHRINE HCL IN 0.9% NACL 1 MG/10 ML
SYRINGE (ML) INTRAVENOUS
Status: DISCONTINUED | OUTPATIENT
Start: 2024-11-04 | End: 2024-11-04 | Stop reason: SDUPTHER

## 2024-11-04 RX ORDER — CEFAZOLIN SODIUM 1 G/3ML
INJECTION, POWDER, FOR SOLUTION INTRAMUSCULAR; INTRAVENOUS
Status: DISCONTINUED | OUTPATIENT
Start: 2024-11-04 | End: 2024-11-04 | Stop reason: SDUPTHER

## 2024-11-04 RX ORDER — HYDROMORPHONE HYDROCHLORIDE 1 MG/ML
0.25 INJECTION, SOLUTION INTRAMUSCULAR; INTRAVENOUS; SUBCUTANEOUS EVERY 5 MIN PRN
Status: DISCONTINUED | OUTPATIENT
Start: 2024-11-04 | End: 2024-11-04 | Stop reason: HOSPADM

## 2024-11-04 RX ORDER — NALOXONE HYDROCHLORIDE 0.4 MG/ML
INJECTION, SOLUTION INTRAMUSCULAR; INTRAVENOUS; SUBCUTANEOUS PRN
Status: DISCONTINUED | OUTPATIENT
Start: 2024-11-04 | End: 2024-11-04 | Stop reason: HOSPADM

## 2024-11-04 RX ADMIN — HYDROCODONE BITARTRATE AND ACETAMINOPHEN 1 TABLET: 10; 325 TABLET ORAL at 06:34

## 2024-11-04 RX ADMIN — CEFAZOLIN 2 G: 1 INJECTION, POWDER, FOR SOLUTION INTRAMUSCULAR; INTRAVENOUS at 07:35

## 2024-11-04 RX ADMIN — HYDROCODONE BITARTRATE AND ACETAMINOPHEN 1 TABLET: 10; 325 TABLET ORAL at 18:36

## 2024-11-04 RX ADMIN — SODIUM CHLORIDE, PRESERVATIVE FREE 10 ML: 5 INJECTION INTRAVENOUS at 11:25

## 2024-11-04 RX ADMIN — PROPOFOL 40 MG: 10 INJECTION, EMULSION INTRAVENOUS at 08:56

## 2024-11-04 RX ADMIN — HYDROMORPHONE HYDROCHLORIDE 0.5 MG: 1 INJECTION, SOLUTION INTRAMUSCULAR; INTRAVENOUS; SUBCUTANEOUS at 09:39

## 2024-11-04 RX ADMIN — ASPIRIN 81 MG: 81 TABLET, COATED ORAL at 20:53

## 2024-11-04 RX ADMIN — SODIUM CHLORIDE: 9 INJECTION, SOLUTION INTRAVENOUS at 07:20

## 2024-11-04 RX ADMIN — ASPIRIN 81 MG: 81 TABLET, COATED ORAL at 11:26

## 2024-11-04 RX ADMIN — VALSARTAN 320 MG: 80 TABLET, FILM COATED ORAL at 11:25

## 2024-11-04 RX ADMIN — SODIUM CHLORIDE, PRESERVATIVE FREE 10 ML: 5 INJECTION INTRAVENOUS at 21:22

## 2024-11-04 RX ADMIN — FENTANYL CITRATE 100 MCG: 50 INJECTION, SOLUTION INTRAMUSCULAR; INTRAVENOUS at 07:29

## 2024-11-04 RX ADMIN — MIDAZOLAM 2 MG: 1 INJECTION INTRAMUSCULAR; INTRAVENOUS at 07:20

## 2024-11-04 RX ADMIN — Medication 100 MCG: at 08:19

## 2024-11-04 RX ADMIN — ROCURONIUM BROMIDE 50 MG: 10 INJECTION, SOLUTION INTRAVENOUS at 07:29

## 2024-11-04 RX ADMIN — Medication 100 MCG: at 08:07

## 2024-11-04 RX ADMIN — LIDOCAINE HYDROCHLORIDE 80 MG: 20 INJECTION, SOLUTION INTRAVENOUS at 07:29

## 2024-11-04 RX ADMIN — PROPOFOL 170 MG: 10 INJECTION, EMULSION INTRAVENOUS at 07:29

## 2024-11-04 RX ADMIN — TRAMADOL HYDROCHLORIDE 50 MG: 50 TABLET, COATED ORAL at 03:58

## 2024-11-04 RX ADMIN — DEXAMETHASONE SODIUM PHOSPHATE 10 MG: 10 INJECTION INTRAMUSCULAR; INTRAVENOUS at 07:29

## 2024-11-04 RX ADMIN — HYDROCODONE BITARTRATE AND ACETAMINOPHEN 1 TABLET: 10; 325 TABLET ORAL at 12:06

## 2024-11-04 RX ADMIN — SUGAMMADEX 200 MG: 100 INJECTION, SOLUTION INTRAVENOUS at 08:58

## 2024-11-04 RX ADMIN — DIPHENHYDRAMINE HYDROCHLORIDE 25 MG: 25 TABLET ORAL at 12:07

## 2024-11-04 RX ADMIN — Medication 50 MCG: at 07:34

## 2024-11-04 RX ADMIN — Medication 150 MCG: at 07:43

## 2024-11-04 RX ADMIN — WATER 2000 MG: 1 INJECTION INTRAMUSCULAR; INTRAVENOUS; SUBCUTANEOUS at 23:33

## 2024-11-04 RX ADMIN — ONDANSETRON 4 MG: 2 INJECTION INTRAMUSCULAR; INTRAVENOUS at 08:37

## 2024-11-04 RX ADMIN — ACETAMINOPHEN 650 MG: 325 TABLET ORAL at 11:23

## 2024-11-04 RX ADMIN — Medication 100 MCG: at 07:38

## 2024-11-04 RX ADMIN — PROPOFOL 30 MG: 10 INJECTION, EMULSION INTRAVENOUS at 08:39

## 2024-11-04 RX ADMIN — DIPHENHYDRAMINE HYDROCHLORIDE 25 MG: 25 TABLET ORAL at 06:34

## 2024-11-04 RX ADMIN — Medication 50 MCG: at 09:02

## 2024-11-04 RX ADMIN — WATER 2000 MG: 1 INJECTION INTRAMUSCULAR; INTRAVENOUS; SUBCUTANEOUS at 16:16

## 2024-11-04 ASSESSMENT — PAIN SCALES - GENERAL
PAINLEVEL_OUTOF10: 7
PAINLEVEL_OUTOF10: 6
PAINLEVEL_OUTOF10: 8
PAINLEVEL_OUTOF10: 6
PAINLEVEL_OUTOF10: 3
PAINLEVEL_OUTOF10: 5
PAINLEVEL_OUTOF10: 8
PAINLEVEL_OUTOF10: 7
PAINLEVEL_OUTOF10: 5
PAINLEVEL_OUTOF10: 7
PAINLEVEL_OUTOF10: 8

## 2024-11-04 ASSESSMENT — PAIN DESCRIPTION - LOCATION
LOCATION: ANKLE

## 2024-11-04 ASSESSMENT — PAIN - FUNCTIONAL ASSESSMENT
PAIN_FUNCTIONAL_ASSESSMENT: ACTIVITIES ARE NOT PREVENTED
PAIN_FUNCTIONAL_ASSESSMENT: PREVENTS OR INTERFERES SOME ACTIVE ACTIVITIES AND ADLS

## 2024-11-04 ASSESSMENT — PAIN DESCRIPTION - PAIN TYPE
TYPE: SURGICAL PAIN
TYPE: SURGICAL PAIN

## 2024-11-04 ASSESSMENT — PAIN DESCRIPTION - DESCRIPTORS
DESCRIPTORS: ACHING;DISCOMFORT
DESCRIPTORS: SHARP;SORE;DISCOMFORT
DESCRIPTORS: THROBBING;SHARP;DISCOMFORT
DESCRIPTORS: SHARP
DESCRIPTORS: ACHING;SORE;PRESSURE
DESCRIPTORS: SHARP
DESCRIPTORS: ACHING;SHOOTING;DISCOMFORT

## 2024-11-04 ASSESSMENT — PAIN DESCRIPTION - ORIENTATION
ORIENTATION: RIGHT

## 2024-11-04 NOTE — ANESTHESIA PRE PROCEDURE
Department of Anesthesiology  Preprocedure Note       Name:  Angelica Rey   Age:  59 y.o.  :  1965                                          MRN:  [id number]         Date:  2024      Surgeon: Surgeon(s):  Juanpablo Maier DO    Procedure: Procedure(s):  RIGHT ANKLE ORIF VS RIGHT ANKLE EXTERNAL FIXATOR APPLICATION    Medications prior to admission:   Prior to Admission medications    Medication Sig Start Date End Date Taking? Authorizing Provider   oxyCODONE-acetaminophen (PERCOCET) 5-325 MG per tablet Take 1 tablet by mouth every 6 hours as needed for Pain for up to 3 days. Max Daily Amount: 4 tablets 11/3/24 11/6/24 Yes Ronald Kumari MD   aspirin 81 MG EC tablet Take 2 tablets by mouth daily for 14 days 11/3/24 11/17/24 Yes Ronald Kumair MD   valsartan (DIOVAN) 320 MG tablet Take 1 tablet by mouth daily 24  Yes Temitope Rich MD   WEGOVY 0.5 MG/0.5ML SOAJ SC injection Inject 0.5 mg into the skin every 7 days 10/28/24  Yes Temitope Rich MD   DUPIXENT 300 MG/2ML SOPN injection Inject 2 mLs into the skin every 14 days 24  Yes Temitope Rich MD   vitamin D (ERGOCALCIFEROL) 1.25 MG (93778 UT) CAPS capsule Take 1 capsule by mouth once a week Sundays   Yes Temitope Rich MD   tiZANidine (ZANAFLEX) 4 MG tablet Take 1 tablet by mouth 3 times daily as needed (neck pain) 23   Win Rodriguez MD   valsartan-hydrochlorothiazide (DIOVAN-HCT) 320-12.5 MG per tablet Take 1 tablet by mouth daily  Patient not taking: Reported on 11/3/2024    ProviderTemitope MD       Current medications:    Current Facility-Administered Medications   Medication Dose Route Frequency Provider Last Rate Last Admin    sodium chloride flush 0.9 % injection 5-40 mL  5-40 mL IntraVENous 2 times per day Antonietta Claudio MD   10 mL at 24    sodium chloride flush 0.9 % injection 5-40 mL  5-40 mL IntraVENous PRN Antonietta Claudio MD        0.9 % sodium chloride infusion

## 2024-11-04 NOTE — ANESTHESIA POSTPROCEDURE EVALUATION
Department of Anesthesiology  Postprocedure Note    Patient: Angelica Rey  MRN: [id number]  YOB: 1965  Date of evaluation: 11/4/2024    Procedure Summary       Date: 11/04/24 Room / Location: [address]    Anesthesia Start: 0720 Anesthesia Stop: 0922    Procedure: Open Reduction Internal Fixation Right Trimalleolar Ankle Fracture (Right: Ankle) Diagnosis:       Closed fracture of right foot, initial encounter      (Closed fracture of right foot, initial encounter [S92.901A])    Surgeons: Juanpablo Maier DO Responsible Provider: Antoinette Tatum MD    Anesthesia Type: General ASA Status: 3            Anesthesia Type: General    Juan Ramon Phase I: Juan Ramon Score: 8    Juan Ramon Phase II:      Anesthesia Post Evaluation    Patient location during evaluation: PACU  Patient participation: complete - patient participated  Level of consciousness: awake  Pain score: 3  Airway patency: patent  Nausea & Vomiting: no nausea  Cardiovascular status: hemodynamically stable  Respiratory status: acceptable  Hydration status: stable  Multimodal analgesia pain management approach    No notable events documented.

## 2024-11-04 NOTE — OP NOTE
Operative Note      Patient: Angelica Rey  YOB: 1965  MRN: [id number]    Date of Procedure: 11/4/2024    Pre-Op Diagnosis Codes:   Right ankle trimalleolar fracture    Post-Op Diagnosis: Same       Procedure(s):  Right ankle trimalar fracture open reduction with internal fixation medial lateral malleolus  Dynamic fluoroscopic evaluation right ankle    Surgeon(s):  Juanpablo Maier DO    Assistant:   Resident: Siva Lora DO    Anesthesia: General    Estimated Blood Loss (mL): less than 100     Complications: None    Specimens:   * No specimens in log *    Implants:  Implant Name Type Inv. Item Serial No.  Lot No. LRB No. Used Action   SCREW BNE L12MM DIA3.5MM ARACELY S STL ST NONCANNULATED ANNA - [id number]  SCREW BNE L12MM DIA3.5MM ARACELY S STL ST NONCANNULATED ANNA  [employer]-  Right 2 Implanted   SCREW BNE L14MM DIA3.5MM ARACELY S STL ST NONCANNULATED ANNA - [id number]  SCREW BNE L14MM DIA3.5MM ARACELY S STL ST NONCANNULATED ANNA  [employer]-  Right 1 Implanted   SCREW BNE L16MM DIA3.5MM ARACELY S STL ST NONCANNULATED ANNA - [id number]  SCREW BNE L16MM DIA3.5MM ARACELY S STL ST NONCANNULATED ANNA  DEPU[employer] [place]-WD  Right 1 Implanted   SCREW BNE L14MM DIA4MM CANC S STL ST KARMA NONLOCKING FULL - [id number]  SCREW BNE L14MM DIA4MM CANC S STL ST KARMA NONLOCKING FULL  DEPU[employer]-  Right 1 Implanted   SCREW BNE L16MM DIA4MM CANC S STL ST KARMA NONLOCKING FULL - [id number]  SCREW BNE L16MM DIA4MM CANC S STL ST KARMA NONLOCKING FULL  DEPUY [employer] [place]-WD  Right 2 Implanted   PLATE BNE L93MM 8 H S STL 1/3 TBLR ANNA COMPR W/ CLLR FOR - [id number]  PLATE BNE L93MM 8 H S STL 1/3 TBLR ANNA COMPR W/ CLLR FOR  DEPUY [employer] [place]-WD  Right 1 Implanted   VOLT Mini Frag 2.0 mm VOLT Cortex Screw 20 mm      Right 1 Implanted   VOLT Mini Frag 2.0mm VOLT Cortex Screws      Right 2 Implanted   VOLT Mini Frag 2.0 mm VOLT Cortex Screws 36mm      Right 1 Implanted   VOLT Mini Frag 2.0mm VOLT

## 2024-11-05 VITALS
SYSTOLIC BLOOD PRESSURE: 148 MMHG | TEMPERATURE: 98.4 F | BODY MASS INDEX: 41.59 KG/M2 | DIASTOLIC BLOOD PRESSURE: 79 MMHG | OXYGEN SATURATION: 99 % | HEIGHT: 64 IN | HEART RATE: 66 BPM | RESPIRATION RATE: 18 BRPM | WEIGHT: 243.61 LBS

## 2024-11-05 LAB
ANION GAP SERPL CALCULATED.3IONS-SCNC: 8 MMOL/L (ref 7–16)
BASOPHILS # BLD: 0.01 K/UL (ref 0–0.2)
BASOPHILS NFR BLD: 0 % (ref 0–2)
BUN SERPL-MCNC: 10 MG/DL (ref 6–20)
CALCIUM SERPL-MCNC: 9.7 MG/DL (ref 8.6–10.2)
CHLORIDE SERPL-SCNC: 103 MMOL/L (ref 98–107)
CO2 SERPL-SCNC: 28 MMOL/L (ref 22–29)
CREAT SERPL-MCNC: 0.7 MG/DL (ref 0.5–1)
EOSINOPHIL # BLD: 0 K/UL (ref 0.05–0.5)
EOSINOPHILS RELATIVE PERCENT: 0 % (ref 0–6)
ERYTHROCYTE [DISTWIDTH] IN BLOOD BY AUTOMATED COUNT: 13.2 % (ref 11.5–15)
GFR, ESTIMATED: >90 ML/MIN/1.73M2
GLUCOSE SERPL-MCNC: 129 MG/DL (ref 74–99)
HCT VFR BLD AUTO: 39.6 % (ref 34–48)
HGB BLD-MCNC: 12.5 G/DL (ref 11.5–15.5)
IMM GRANULOCYTES # BLD AUTO: 0.03 K/UL (ref 0–0.58)
IMM GRANULOCYTES NFR BLD: 0 % (ref 0–5)
LYMPHOCYTES NFR BLD: 1.42 K/UL (ref 1.5–4)
LYMPHOCYTES RELATIVE PERCENT: 16 % (ref 20–42)
MCH RBC QN AUTO: 29.2 PG (ref 26–35)
MCHC RBC AUTO-ENTMCNC: 31.6 G/DL (ref 32–34.5)
MCV RBC AUTO: 92.5 FL (ref 80–99.9)
MONOCYTES NFR BLD: 0.53 K/UL (ref 0.1–0.95)
MONOCYTES NFR BLD: 6 % (ref 2–12)
NEUTROPHILS NFR BLD: 78 % (ref 43–80)
NEUTS SEG NFR BLD: 6.81 K/UL (ref 1.8–7.3)
PLATELET # BLD AUTO: 397 K/UL (ref 130–450)
PMV BLD AUTO: 10 FL (ref 7–12)
POTASSIUM SERPL-SCNC: 4.3 MMOL/L (ref 3.5–5)
RBC # BLD AUTO: 4.28 M/UL (ref 3.5–5.5)
SODIUM SERPL-SCNC: 139 MMOL/L (ref 132–146)
WBC OTHER # BLD: 8.8 K/UL (ref 4.5–11.5)

## 2024-11-05 PROCEDURE — 2580000003 HC RX 258: Performed by: INTERNAL MEDICINE

## 2024-11-05 PROCEDURE — 6370000000 HC RX 637 (ALT 250 FOR IP): Performed by: NURSE PRACTITIONER

## 2024-11-05 PROCEDURE — 96374 THER/PROPH/DIAG INJ IV PUSH: CPT

## 2024-11-05 PROCEDURE — G0378 HOSPITAL OBSERVATION PER HR: HCPCS

## 2024-11-05 PROCEDURE — 80048 BASIC METABOLIC PNL TOTAL CA: CPT

## 2024-11-05 PROCEDURE — 6370000000 HC RX 637 (ALT 250 FOR IP)

## 2024-11-05 PROCEDURE — 97530 THERAPEUTIC ACTIVITIES: CPT

## 2024-11-05 PROCEDURE — 99239 HOSP IP/OBS DSCHRG MGMT >30: CPT | Performed by: LICENSED PRACTICAL NURSE

## 2024-11-05 PROCEDURE — 6370000000 HC RX 637 (ALT 250 FOR IP): Performed by: INTERNAL MEDICINE

## 2024-11-05 PROCEDURE — 85025 COMPLETE CBC W/AUTO DIFF WBC: CPT

## 2024-11-05 PROCEDURE — 97161 PT EVAL LOW COMPLEX 20 MIN: CPT

## 2024-11-05 PROCEDURE — 36415 COLL VENOUS BLD VENIPUNCTURE: CPT

## 2024-11-05 PROCEDURE — 6360000002 HC RX W HCPCS: Performed by: INTERNAL MEDICINE

## 2024-11-05 RX ORDER — OXYCODONE HYDROCHLORIDE 5 MG/1
5 TABLET ORAL EVERY 4 HOURS PRN
Status: DISCONTINUED | OUTPATIENT
Start: 2024-11-05 | End: 2024-11-05 | Stop reason: HOSPADM

## 2024-11-05 RX ORDER — OXYCODONE HYDROCHLORIDE 10 MG/1
10 TABLET ORAL EVERY 4 HOURS PRN
Status: DISCONTINUED | OUTPATIENT
Start: 2024-11-05 | End: 2024-11-05 | Stop reason: HOSPADM

## 2024-11-05 RX ORDER — VALSARTAN AND HYDROCHLOROTHIAZIDE 320; 12.5 MG/1; MG/1
1 TABLET, FILM COATED ORAL DAILY
Qty: 30 TABLET | Refills: 0 | Status: SHIPPED | OUTPATIENT
Start: 2024-11-05

## 2024-11-05 RX ORDER — DOCUSATE SODIUM 100 MG/1
100 CAPSULE, LIQUID FILLED ORAL 2 TIMES DAILY
Qty: 10 CAPSULE | Refills: 0 | Status: SHIPPED | OUTPATIENT
Start: 2024-11-05 | End: 2024-11-10

## 2024-11-05 RX ORDER — MORPHINE SULFATE 2 MG/ML
2 INJECTION, SOLUTION INTRAMUSCULAR; INTRAVENOUS ONCE
Status: COMPLETED | OUTPATIENT
Start: 2024-11-05 | End: 2024-11-05

## 2024-11-05 RX ORDER — POLYETHYLENE GLYCOL 3350 17 G/17G
17 POWDER, FOR SOLUTION ORAL DAILY PRN
Qty: 119 G | Refills: 0 | Status: SHIPPED | OUTPATIENT
Start: 2024-11-05 | End: 2024-11-12

## 2024-11-05 RX ADMIN — ASPIRIN 81 MG: 81 TABLET, COATED ORAL at 09:09

## 2024-11-05 RX ADMIN — SODIUM CHLORIDE, PRESERVATIVE FREE 10 ML: 5 INJECTION INTRAVENOUS at 09:08

## 2024-11-05 RX ADMIN — HYDROCODONE BITARTRATE AND ACETAMINOPHEN 1 TABLET: 10; 325 TABLET ORAL at 01:14

## 2024-11-05 RX ADMIN — OXYCODONE HYDROCHLORIDE 10 MG: 10 TABLET ORAL at 09:08

## 2024-11-05 RX ADMIN — MORPHINE SULFATE 2 MG: 2 INJECTION, SOLUTION INTRAMUSCULAR; INTRAVENOUS at 04:15

## 2024-11-05 RX ADMIN — VALSARTAN 320 MG: 80 TABLET, FILM COATED ORAL at 09:09

## 2024-11-05 RX ADMIN — OXYCODONE HYDROCHLORIDE 10 MG: 10 TABLET ORAL at 14:44

## 2024-11-05 ASSESSMENT — PAIN DESCRIPTION - ORIENTATION
ORIENTATION: RIGHT

## 2024-11-05 ASSESSMENT — PAIN SCALES - GENERAL
PAINLEVEL_OUTOF10: 9
PAINLEVEL_OUTOF10: 5
PAINLEVEL_OUTOF10: 8
PAINLEVEL_OUTOF10: 7
PAINLEVEL_OUTOF10: 5
PAINLEVEL_OUTOF10: 7
PAINLEVEL_OUTOF10: 5
PAINLEVEL_OUTOF10: 7

## 2024-11-05 ASSESSMENT — PAIN DESCRIPTION - DESCRIPTORS
DESCRIPTORS: ACHING;SORE;PRESSURE
DESCRIPTORS: ACHING;THROBBING;DISCOMFORT
DESCRIPTORS: ACHING;SORE;PRESSURE
DESCRIPTORS: ACHING;DISCOMFORT;SHARP

## 2024-11-05 ASSESSMENT — PAIN DESCRIPTION - LOCATION
LOCATION: ANKLE
LOCATION: ANKLE
LOCATION: ANKLE;LEG
LOCATION: ANKLE;LEG

## 2024-11-05 ASSESSMENT — PAIN - FUNCTIONAL ASSESSMENT
PAIN_FUNCTIONAL_ASSESSMENT: ACTIVITIES ARE NOT PREVENTED
PAIN_FUNCTIONAL_ASSESSMENT: PREVENTS OR INTERFERES SOME ACTIVE ACTIVITIES AND ADLS
PAIN_FUNCTIONAL_ASSESSMENT: ACTIVITIES ARE NOT PREVENTED
PAIN_FUNCTIONAL_ASSESSMENT: PREVENTS OR INTERFERES SOME ACTIVE ACTIVITIES AND ADLS

## 2024-11-05 ASSESSMENT — PAIN SCALES - WONG BAKER: WONGBAKER_NUMERICALRESPONSE: HURTS LITTLE MORE

## 2024-11-05 NOTE — DISCHARGE SUMMARY
3D tomosynthesis combination imaging performed through both breasts in the MLO and CC projection.  Computer aided detection was utilized in the interpretation of this exam. COMPARISON: Prior mammogram 2023, 2020 HISTORY: Screening. Vandana Cosme life time risk score of breast cancer is 9.5 % (>20% is high risk). Breast cancer risk score is calculated based on information provided by the patient at time of visit in addition to a patient's readily available medical history. Please note that inadequate breast health history, which is predominantly based on patient reported data, may result in an inaccurate calculation. FINDINGS: There are scattered areas of fibroglandular density.  No suspicious mass, calcification or architectural distortion is identified in either breast.     No mammographic evidence of malignancy in either breast. RECOMMENDATION: Bilateral mammogram is advised in 1 year. BIRADS: BIRADS - CATEGORY 1 Negative. OVERALL ASSESSMENT - NEGATIVE Performing Facility: [facility] [address] [address] Phone: [phone]       Discharge Medications:      Medication List        START taking these medications      aspirin 81 MG EC tablet  Take 2 tablets by mouth daily for 14 days     docusate sodium 100 MG capsule  Commonly known as: COLACE  Take 1 capsule by mouth 2 times daily for 5 days     HYDROcodone-acetaminophen  MG per tablet  Commonly known as: NORCO  Take 1 tablet by mouth every 8 hours as needed for Pain for up to 3 days. Max Daily Amount: 3 tablets     oxyCODONE-acetaminophen 5-325 MG per tablet  Commonly known as: Percocet  Take 1 tablet by mouth every 6 hours as needed for Pain for up to 3 days. Max Daily Amount: 4 tablets     polyethylene glycol 17 g packet  Commonly known as: GLYCOLAX  Take 1 packet by mouth daily as needed for Constipation            CONTINUE taking these medications      valsartan-hydroCHLOROthiazide

## 2024-11-05 NOTE — CARE COORDINATION
Transition of care update. Probable discharge today. Call to Elizabeth in [facility] to deliver WW due to probable discharge today. Patient requesting C be set up for PT/OT and skilled nursing. Preferences for [facility] and referral made. Await if accepted. Mother at bedside and will transport at discharge. CM will follow.  \Electronically signed by Brandin Saeed RN on 11/5/2024 at 12:21 PM  Accepted at [facility]. Updated patient. Orders in Epic. Marcelle stated she will have a 10% co payment per visit. Patient was updated and agreeable to 10% co-payment, Wanted to know approximate visit cost, and Marcelle to update. MB  
Planning:    Patient lives with: Alone Type of Home: House  Primary Care Giver: Self  Patient Support Systems include: Parent, Children   Current Financial resources:    Current community resources:    Current services prior to admission: None            Current DME:              Type of Home Care services:  None    ADLS  Prior functional level: Independent in ADLs/IADLs  Current functional level: Independent in ADLs/IADLs    PT AM-PAC:   /24  OT AM-PAC:   /24    Family can provide assistance at DC: Yes  Would you like Case Management to discuss the discharge plan with any other family members/significant others, and if so, who? Yes  Plans to Return to Present Housing: Yes  Other Identified Issues/Barriers to RETURNING to current housing: yes  Potential Assistance needed at discharge: N/A            Potential DME:  crutches will purchase knee scooter  Patient expects to discharge to: House  Plan for transportation at discharge: family     Financial    Payor: BCBS / Plan: BCBS - [place] PPO / Product Type: *No Product type* /     Does insurance require precert for SNF: Yes    Potential assistance Purchasing Medications: No  Meds-to-Beds request: Yes      [id number] [place], [place] - [address] [facility] [phone] - F [phone]  [address] [place], [place] [address]  Phone: [phone] Fax: [phone]    [id number] [place], [place] - [address] [facility] P [phone] - F [phone]  [address] [facility] [address] [facility] [address] [facility] [address]  Phone: [phone] Fax: [phone]      Notes:    Factors facilitating achievement of predicted outcomes: Family support    Barriers to discharge: none    Additional Case Management Notes: see notes    The Plan for Transition of Care is related to the following treatment goals of Closed fracture of right ankle, initial encounter [Y52.651A]  Ambulatory dysfunction [R26.2]        The Patient and/or Patient Representative Agree with the Discharge Plan?

## 2024-11-05 NOTE — PROGRESS NOTES
[facility] Hospitalist Progress Note    Admitting Date and Time: 11/3/2024 12:34 AM  Admit Dx: Closed fracture of right ankle, initial encounter [S82.691A]  Ambulatory dysfunction [R26.2]    Subjective:  Patient is being followed for Closed fracture of right ankle, initial encounter [S82.945D]  Ambulatory dysfunction [R26.2]     Seen after OR.  She is complaining of pain in the ankle.  Otherwise denies any other acute complaints.    ROS: denies fever, chills, cp, sob, n/v, HA unless stated above.      ceFAZolin (ANCEF) IV  2,000 mg IntraVENous Q8H    aspirin  81 mg Oral BID    sodium chloride flush  5-40 mL IntraVENous 2 times per day    enoxaparin  30 mg SubCUTAneous BID    vitamin D  50,000 Units Oral Weekly    valsartan  320 mg Oral Daily    And    hydroCHLOROthiazide  12.5 mg Oral Daily     sodium chloride, , PRN  ondansetron, 4 mg, Q8H PRN   Or  ondansetron, 4 mg, Q6H PRN  sodium chloride flush, 5-40 mL, PRN  sodium chloride, , PRN  potassium chloride, 40 mEq, PRN   Or  potassium alternative oral replacement, 40 mEq, PRN   Or  potassium chloride, 10 mEq, PRN  magnesium sulfate, 2,000 mg, PRN  ondansetron, 4 mg, Q8H PRN   Or  ondansetron, 4 mg, Q6H PRN  polyethylene glycol, 17 g, Daily PRN  acetaminophen, 650 mg, Q6H PRN   Or  acetaminophen, 650 mg, Q6H PRN  HYDROcodone-acetaminophen, 1 tablet, Q6H PRN  traMADol, 50 mg, Q6H PRN  diphenhydrAMINE, 25 mg, Q6H PRN         Objective:    /75   Pulse 88   Temp 97.3 °F (36.3 °C) (Temporal)   Resp 16   Ht 1.626 m (5' 4\")   Wt 110.2 kg (242 lb 15.2 oz)   SpO2 100%   BMI 41.70 kg/m²     General Appearance: alert and oriented to person, place and time and in no acute distress  Skin: warm and dry  Head: normocephalic and atraumatic  Eyes: pupils equal, round, and reactive to light, extraocular eye movements intact, conjunctivae normal  Neck: neck supple and non tender without mass   Pulmonary/Chest: clear to auscultation bilaterally- no wheezes, rales 
    Internal Medicine Progress Note      Synopsis: Patient admitted on 11/3/2024 presented to ED after a mechanical fall with right ankle pain. X-ray of the right ankle revealed a closed right trimalleolar ankle fracture. Ortho splinted ankle after a closed reduction, patient was unable to ambulate on crutches. Underwent ORIF. PT recommending home with AMPAC of 17. Patient to follow with PCP and Ortho as outpatient.       Assessment and Plan     Closed right malleolar ankle fracture  PO ORIF   Pain management  PT/OT  Ortho following- clear for D/C today    HTN  Slightly above goal with pain   Adjusted norco to roxycodone  Pt though she got switched back to valsartan by PCP by mistake resumed Diovan     constipation  Bowel regimen iniciated       DVT Prophylaxis:ASA    Disposition:     Subjective:   Ms. Dominguez sitting up in chair after ambulating back from the BR using crutches competently. Leg elevated. Pain is controlled on oral pain medication, has not had a BM since arrival, no ABD discomfort, BS present x4 quadrants  soft. Toes warm, able to wiggle, splint dry and intact.. Pain control per ortho recommendations.         Exam:  BP (!) 148/75   Pulse 92   Temp 98.6 °F (37 °C) (Oral)   Resp 18   Ht 1.626 m (5' 4\")   Wt 110.5 kg (243 lb 9.7 oz)   SpO2 100%   BMI 41.82 kg/m²   General appearance: No apparent distress, appears stated age and cooperative.  Respiratory:  Normal respiratory effort. Clear to auscultation, bilaterally without Rales/Wheezes/Rhonchi.  Cardiovascular: Regular rate and rhythm with normal S1/S2 without murmurs, rubs or gallops.  Abdomen: Soft, non-tender, non-distended with normal bowel sounds.  Musculoskeletal: Casted RLE  Skin:  No rashes    Neurologic: awake, alert and following commands     Medications:  Reviewed    Infusion Medications    sodium chloride      sodium chloride Stopped (11/04/24 0913)     Scheduled Medications    aspirin  81 mg Oral BID    sodium chloride flush  5-40 mL 
4 Eyes Skin Assessment     NAME:  Angelica Rey  YOB: 1965  MEDICAL RECORD NUMBER:  [id number]    The patient is being assessed for  Admission    I agree that at least one RN has performed a thorough Head to Toe Skin Assessment on the patient. ALL assessment sites listed below have been assessed.      Areas assessed by both nurses:    Head, Face, Ears, Shoulders, Back, Chest, Arms, Elbows, Hands, Sacrum. Buttock, Coccyx, Ischium, and Legs. Feet and Heels        Does the Patient have a Wound? No noted wound(s)       Lorenzo Prevention initiated by RN: Yes  Wound Care Orders initiated by RN: No    Pressure Injury (Stage 3,4, Unstageable, DTI, NWPT, and Complex wounds) if present, place Wound referral order by RN under : No    New Ostomies, if present place, Ostomy referral order under : No     Nurse 1 eSignature: Electronically signed by NAINA REHMAN RN on 11/3/24 at 3:00 PM EST    **SHARE this note so that the co-signing nurse can place an eSignature**    Nurse 2 eSignature: Electronically signed by Jolanta Reese RN on 11/3/24 at 3:17 PM EST  
CLINICAL PHARMACY NOTE: MEDS TO BEDS    Total # of Prescriptions Filled: 3   The following medications were delivered to the patient:  DOCUSATE 100 MG  MIRALAX #238  VALSARTAN-HCTZ 320-12.5 MG    Additional Documentation:   DELIVERED TO PT AT BEDSIDE  
DVT Prophylaxis Adjustment Policy (DVT Prophylaxis)     This patient is on DVT Prophylaxis medication that requires a dose adjustment      Date Body Weight IBW  Adjusted BW SCr  CrCl Dialysis status   11/3/2024 104.3 kg (229 lb 15 oz) Ideal body weight: 54.7 kg (120 lb 9.5 oz)  Adjusted ideal body weight: 74.5 kg (164 lb 5.3 oz) Serum creatinine: 0.6 mg/dL 11/03/24 0916  Estimated creatinine clearance: 122 mL/min N/a       Pharmacy has dose-adjusted the DVT Prophylaxis regimen to match   the recommendations from the following table        Ordered Medication:Lovenox 40mg daily    Order Changed/converted to: Lovenox 30mg BID      These changes were made per protocol according to the [facility] Pharmacist   Review for Appropriate Use and Automatic Dose Adjustments of   Subcutaneous Anticoagulants Policy     *Please note this dose may need readjusted if patient's condition changes.    Please contact pharmacy with any questions regarding these changes.    Cammie Hansen RPH  11/3/2024  9:56 AM    
Department of Orthopedic Surgery  Resident Progress Note    Patient seen and examined at bedside this morning, pain is well-controlled.  Patient did complain of increasing pain following occupational therapy yesterday.  Denies any chest pain, shortness of breath.  Patient would like to go home today    VITALS:  BP (!) 149/82   Pulse 71   Temp 97 °F (36.1 °C) (Temporal)   Resp 16   Ht 1.626 m (5' 4\")   Wt 110.5 kg (243 lb 9.7 oz)   SpO2 97%   BMI 41.82 kg/m²     General: alert and oriented to person, place and time, well-developed and well-nourished, in no acute distress    MUSCULOSKELETAL:   right lower extremity:  Splint was clean dry and intact  Compartments are soft and compressible  Patient able to wiggle toes through end of the splint  Toes are warm and well-perfused  Distal sensation intact to peroneal, sural, saphenous, tibial nerve distribution to the distal foot.      CBC:   Lab Results   Component Value Date/Time    WBC 5.2 11/04/2024 06:05 AM    HGB 12.7 11/04/2024 06:05 AM    HCT 40.2 11/04/2024 06:05 AM     11/04/2024 06:05 AM     PT/INR:    Lab Results   Component Value Date/Time    PROTIME 13.1 11/03/2024 10:55 AM    INR 1.2 11/03/2024 10:55 AM         ASSESSMENT  S/P right ankle ORIF on 11/4    PLAN      Continue physical therapy and protocol: NWB -right LE  24 hour abx coverage, to be completed today  Deep venous thrombosis prophylaxis -aspirin 81 mg twice daily, early mobilization  PT/OT  Pain Control: IV and PO  Monitor H&H: 12.7 yesterday  D/C Plan: Patient okay to discharge from orthopedic standpoint following physical therapy and last dose of postoperative antibiotics.  Orthopedics will continue to follow peripherally.  Please reach out any questions.      Electronically signed by Siva Lora DO on 11/5/2024 at 6:37 AM  Note: This report was completed using [employer] voiced recognition software.  Every effort has been made to ensure accuracy; however, inadvertent computerized 
Discharge instructions reviewed at bedside with patient and/or family. All questions answered at this time, and understanding was verbalized. Patient placed in transport for discharge.    
Occupational Therapy  OCCUPATIONAL THERAPY INITIAL EVALUATION    [facility]  [address], [place]      Date:2024                                                Patient Name: Angelica Rey  MRN: [id number]  : 1965  Room: [address] [place] [address]    Evaluating OT: Jose D Aguiar OTR/L #[id number]     Referring Provider:     Antonietta Claudio MD     Specific Provider Orders/Date: OT eval and treat 24    Diagnosis: Closed fracture of right ankle, initial encounter [S82.315T]  Ambulatory dysfunction [R26.2]   Pt admitted to hospital following fall; R ankle fx     Surgery / Procedure: 24 Right ankle trimalar fracture open reduction with internal fixation medial lateral malleolus     Dynamic fluoroscopic evaluation right ankle     Pertinent Medical History:  has a past medical history of Hypertension.       Precautions:  Fall Risk, NWB R LE    Assessment of current deficits    [x] Functional mobility  [x]ADLs  [x] Strength               []Cognition    [x] Functional transfers   [x] IADLs         [x] Safety Awareness   [x]Endurance    [] Fine Coordination              [x] Balance      [] Vision/perception   []Sensation     []Gross Motor Coordination  [] ROM  [] Delirium                   [] Motor Control     OT PLAN OF CARE   OT POC based on physician orders, patient diagnosis and results of clinical assessment    Frequency/Duration 1-3 days/wk for 2 weeks PRN   Specific OT Treatment Interventions to include:   * Instruction/training on adapted ADL techniques and AE recommendations to increase functional independence within precautions       * Training on energy conservation strategies, correct breathing pattern and techniques to improve independence/tolerance for self-care routine  * Functional transfer/mobility training/DME recommendations for increased independence, safety, and fall prevention  * Patient/Family education to increase follow through with 
Ortho resident stated they are in OR and will address after  
PATIENT ARRIVED TO OR SUITE WITH TELEMONITOR. TELEMONITOR REMOVED AND WILL RETURN WITH PATIENT TO RECOVERY. NURSE WILL BE NOTIFIED AT THAT TIME.     Electronically signed by Johanne Recinos RN on 11/4/24 at 7:59 AM EST        
Patient removed from oxygen started in PACU, 97% on room air   
Perfect serve sent to Dr. Kern, to see if patient is okay for discharge   
Perfect serve sent to ortho resident for pain medication script   
Xray completed  
training 56899 0 minutes  [] Manual therapy 70169 0 minutes  [x] Therapeutic activities 43546 8 minutes  [] Therapeutic exercises 66752 0 minutes  [] Neuromuscular reeducation 30958 0 minutes     Paul Logan, PT, DPT  [id number]

## 2024-11-05 NOTE — PLAN OF CARE
Problem: Pain  Goal: Verbalizes/displays adequate comfort level or baseline comfort level  11/5/2024 0026 by Jasmina Mares RN  Outcome: Progressing  11/4/2024 1220 by Kaci Gilman RN  Outcome: Progressing     Problem: Safety - Adult  Goal: Free from fall injury  11/5/2024 0026 by Jasmina Mares RN  Outcome: Progressing  11/4/2024 1220 by Kaci Gilman RN  Outcome: Progressing     Problem: Skin/Tissue Integrity  Goal: Absence of new skin breakdown  Description: 1.  Monitor for areas of redness and/or skin breakdown  2.  Assess vascular access sites hourly  3.  Every 4-6 hours minimum:  Change oxygen saturation probe site  4.  Every 4-6 hours:  If on nasal continuous positive airway pressure, respiratory therapy assess nares and determine need for appliance change or resting period.  11/5/2024 0026 by Jasmina Mares RN  Outcome: Progressing  11/4/2024 1220 by Kaci Gilman RN  Outcome: Progressing     Problem: Discharge Planning  Goal: Discharge to home or other facility with appropriate resources  11/5/2024 0026 by Jasmina Mares RN  Outcome: Progressing  11/4/2024 1220 by Kaci Gilman RN  Outcome: Progressing  Flowsheets (Taken 11/4/2024 1020)  Discharge to home or other facility with appropriate resources:   Arrange for needed discharge resources and transportation as appropriate   Identify barriers to discharge with patient and caregiver   Identify discharge learning needs (meds, wound care, etc)     Problem: Chronic Conditions and Co-morbidities  Goal: Patient's chronic conditions and co-morbidity symptoms are monitored and maintained or improved  11/5/2024 0026 by Jasmina Mares, RN  Outcome: Progressing  11/4/2024 1220 by Kaci Gilman RN  Outcome: Progressing

## 2024-11-07 DIAGNOSIS — S82.891A CLOSED FRACTURE OF RIGHT ANKLE, INITIAL ENCOUNTER: ICD-10-CM

## 2024-11-07 RX ORDER — METHOCARBAMOL 750 MG/1
750 TABLET, FILM COATED ORAL 3 TIMES DAILY
Qty: 90 TABLET | Refills: 0 | Status: SHIPPED | OUTPATIENT
Start: 2024-11-07 | End: 2024-12-07

## 2024-11-07 RX ORDER — ACETAMINOPHEN 500 MG
500 TABLET ORAL 4 TIMES DAILY PRN
Qty: 120 TABLET | Refills: 0 | Status: SHIPPED | OUTPATIENT
Start: 2024-11-07

## 2024-11-07 RX ORDER — OXYCODONE AND ACETAMINOPHEN 5; 325 MG/1; MG/1
1 TABLET ORAL EVERY 6 HOURS PRN
Qty: 28 TABLET | Refills: 0 | Status: SHIPPED | OUTPATIENT
Start: 2024-11-07 | End: 2024-11-14

## 2024-11-07 RX ORDER — IBUPROFEN 800 MG/1
800 TABLET, FILM COATED ORAL 2 TIMES DAILY PRN
Qty: 60 TABLET | Refills: 0 | Status: SHIPPED | OUTPATIENT
Start: 2024-11-07

## 2024-11-07 NOTE — TELEPHONE ENCOUNTER
Patient called office requesting refill of Oxycodone. Pt also requesting a script order for Tylenol, Motrin 800, and Robaxin for muscle spasms to [facility] [address].. Routing to providers for review.    DOS: 11/04/24 Right Ankle Trimall ORIF

## 2024-11-07 NOTE — TELEPHONE ENCOUNTER
Controlled Substance Monitoring:    Acute and Chronic Pain Monitoring:   RX Monitoring Periodic Controlled Substance Monitoring   11/7/2024   2:52 PM No signs of potential drug abuse or diversion identified.

## 2024-11-12 DIAGNOSIS — S82.891A CLOSED FRACTURE OF RIGHT ANKLE, INITIAL ENCOUNTER: Primary | ICD-10-CM

## 2024-11-18 ENCOUNTER — OFFICE VISIT (OUTPATIENT)
Dept: ORTHOPEDIC SURGERY | Age: 59
End: 2024-11-18
Payer: COMMERCIAL

## 2024-11-18 ENCOUNTER — HOSPITAL ENCOUNTER (OUTPATIENT)
Dept: GENERAL RADIOLOGY | Age: 59
Discharge: HOME | End: 2024-11-20
Payer: COMMERCIAL

## 2024-11-18 VITALS
TEMPERATURE: 98 F | DIASTOLIC BLOOD PRESSURE: 84 MMHG | OXYGEN SATURATION: 96 % | RESPIRATION RATE: 14 BRPM | HEART RATE: 84 BPM | SYSTOLIC BLOOD PRESSURE: 122 MMHG

## 2024-11-18 DIAGNOSIS — S82.891A CLOSED FRACTURE OF RIGHT ANKLE, INITIAL ENCOUNTER: ICD-10-CM

## 2024-11-18 DIAGNOSIS — S82.891A CLOSED FRACTURE OF RIGHT ANKLE, INITIAL ENCOUNTER: Primary | ICD-10-CM

## 2024-11-18 PROCEDURE — 1073RET COMPLETION OF WORKABILITY PRESCRIPTION: Performed by: PHYSICIAN ASSISTANT

## 2024-11-18 PROCEDURE — 99213 OFFICE O/P EST LOW 20 MIN: CPT | Performed by: PHYSICIAN ASSISTANT

## 2024-11-18 PROCEDURE — L4350 ANKLE CONTROL ORTHO PRE OTS: HCPCS | Performed by: PHYSICIAN ASSISTANT

## 2024-11-18 PROCEDURE — 99024 POSTOP FOLLOW-UP VISIT: CPT | Performed by: PHYSICIAN ASSISTANT

## 2024-11-18 PROCEDURE — 73610 X-RAY EXAM OF ANKLE: CPT

## 2024-11-18 PROCEDURE — 1040RET RETAIN PT ID & RECRUITMENT: Performed by: PHYSICIAN ASSISTANT

## 2024-11-18 RX ORDER — OXYCODONE AND ACETAMINOPHEN 5; 325 MG/1; MG/1
1 TABLET ORAL EVERY 6 HOURS PRN
COMMUNITY

## 2024-11-18 RX ORDER — DUPILUMAB 100 MG/.67ML
0.67 INJECTION, SOLUTION SUBCUTANEOUS
COMMUNITY

## 2024-11-18 RX ORDER — OXYCODONE AND ACETAMINOPHEN 5; 325 MG/1; MG/1
1 TABLET ORAL EVERY 8 HOURS PRN
Qty: 21 TABLET | Refills: 0 | Status: SHIPPED | OUTPATIENT
Start: 2024-11-18 | End: 2024-11-25

## 2024-11-18 RX ORDER — ASPIRIN 81 MG/1
162 TABLET ORAL DAILY
Qty: 60 TABLET | Refills: 2 | Status: SHIPPED | OUTPATIENT
Start: 2024-11-18 | End: 2025-02-16

## 2024-11-18 NOTE — PROGRESS NOTES
Chief Complaint   Patient presents with    Post-Op Check     2wkl PO Right ankle Trimall ORIF. Pt ambulating in wheelchair. Pt states no pain.         OP:SURGEON: Dr. Juanpablo Maier DO  DATE OF PROCEDURE: 11/4/24  PROCEDURE:  Right ankle trimalar fracture open reduction with internal fixation medial lateral malleolus  Dynamic fluoroscopic evaluation right ankle    POD: 2 weeks    Subjective:  Angelica Rey is following up from the above surgery. She is NWB on right lower extremity. She ambulates with assistive device, walker.  Pain to extremity is mild and is  taking prescribed pain medication, Percocet 5/325 mg, tylenol, and robaxin as needed. They denies numbness, tingling to the right lower extremity. Denies calf pain, chest pain, or shortness of breath.  Patient continues to use DVT prophylaxis, ASA 81 mg BID.      Review of Systems -  All pertinent positives/negative in HPI     Objective:    General: Alert and oriented X 3, normocephalic atraumatic, external ears and eye normal, sclera clear, no acute distress, respirations easy and unlabored with no audible wheezes, skin warm and dry, speech and dress appropriate for noted age, affect euthymic.    Extremity:  Right Lower Extremity  Skin clean dry and intact, without signs of infection  Incision healing well with no erythema or signs of infection  mild edema noted  Compartments supple throughout thigh and leg  Calf supple and not tender  negative Homans  Demonstrates active knee flexion and extension and stiffness with ankle DF/PF  States sensation intact to touch in sural, deep peroneal, superficial peroneal, saphenous, posterior tibial  nerve distributions to foot/ankle.  Palpable dorsalis pedis and posterior tibialis pulses, cap refill brisk in toes, foot warm/perfused.      /84 (Site: Right Upper Arm, Position: Sitting, Cuff Size: Medium Adult)   Pulse 84   Temp 98 °F (36.7 °C) (Oral)   Resp 14   SpO2 96%     XR:   X-rays of the right ankle were

## 2024-11-18 NOTE — PATIENT INSTRUCTIONS
Removed sutures  Steri strips should fall off in the shower at some point, if they do not fall off in 10 days, remove them  Dressing: Can remove dressing in 1-2 days then open to air  Can shower tomorrow over incision. NO Soaking or submerging incision in water until fully healed & all scabs are gone    WB:  Non-weight bearing on right lower extremity    Walking Boot: On at all times, can remove for bathing and therapy. Remove daily for evaluation of skin breakdown     DVT: Continue with ASA 81 mg BID as ordered  Pain control : Pain medication refill sent to pharmacy today    Continue with ice to the injured extremity 2-3 times per day for swelling  If able continue with elevation and compression    Follow up in 4 weeks with XR of the right ankle to be done in office

## 2024-12-02 DIAGNOSIS — S82.891A CLOSED FRACTURE OF RIGHT ANKLE, INITIAL ENCOUNTER: Primary | ICD-10-CM

## 2024-12-02 RX ORDER — OXYCODONE AND ACETAMINOPHEN 5; 325 MG/1; MG/1
1 TABLET ORAL 2 TIMES DAILY PRN
Qty: 14 TABLET | Refills: 0 | Status: SHIPPED | OUTPATIENT
Start: 2024-12-02 | End: 2024-12-09

## 2024-12-02 NOTE — TELEPHONE ENCOUNTER
Percocet refilled and wean to twice daily as needed    Controlled Substance Monitoring:    Acute and Chronic Pain Monitoring:   RX Monitoring Periodic Controlled Substance Monitoring   12/2/2024   4:10 PM No signs of potential drug abuse or diversion identified.

## 2024-12-02 NOTE — TELEPHONE ENCOUNTER
Received call from patient requesting refill of Percocet 5/325 mg at [facility].    Date of Procedure: 11/4/2024     Procedure(s):  Right ankle trimalar fracture open reduction with internal fixation medial lateral malleolus  Dynamic fluoroscopic evaluation right ankle     Surgeon(s):  Juanpablo Maier DO    Weeks from Surgery: 4    Last OV: 11/18/24    Medication pended and routed to providers for decision and signature.    Future Appointments   Date Time Provider Department Center   12/16/2024  1:00 PM SCHEDULE, [facility]       Electronically signed by Cristina Ibanez RN on 12/2/2024 at 3:08 PM

## 2024-12-10 DIAGNOSIS — S82.891A CLOSED FRACTURE OF RIGHT ANKLE, INITIAL ENCOUNTER: Primary | ICD-10-CM

## 2024-12-16 ENCOUNTER — OFFICE VISIT (OUTPATIENT)
Dept: ORTHOPEDIC SURGERY | Age: 59
End: 2024-12-16
Payer: COMMERCIAL

## 2024-12-16 ENCOUNTER — HOSPITAL ENCOUNTER (OUTPATIENT)
Dept: GENERAL RADIOLOGY | Age: 59
Discharge: HOME | End: 2024-12-18
Payer: COMMERCIAL

## 2024-12-16 VITALS
RESPIRATION RATE: 18 BRPM | DIASTOLIC BLOOD PRESSURE: 78 MMHG | HEART RATE: 85 BPM | SYSTOLIC BLOOD PRESSURE: 126 MMHG | TEMPERATURE: 98.1 F | OXYGEN SATURATION: 97 %

## 2024-12-16 DIAGNOSIS — S82.891A CLOSED FRACTURE OF RIGHT ANKLE, INITIAL ENCOUNTER: ICD-10-CM

## 2024-12-16 DIAGNOSIS — S82.891A CLOSED FRACTURE OF RIGHT ANKLE, INITIAL ENCOUNTER: Primary | ICD-10-CM

## 2024-12-16 PROCEDURE — 73610 X-RAY EXAM OF ANKLE: CPT

## 2024-12-16 PROCEDURE — 1073RET COMPLETION OF WORKABILITY PRESCRIPTION: Performed by: PHYSICIAN ASSISTANT

## 2024-12-16 PROCEDURE — 99024 POSTOP FOLLOW-UP VISIT: CPT | Performed by: PHYSICIAN ASSISTANT

## 2024-12-16 PROCEDURE — 99212 OFFICE O/P EST SF 10 MIN: CPT | Performed by: PHYSICIAN ASSISTANT

## 2024-12-16 RX ORDER — GABAPENTIN 300 MG/1
300 CAPSULE ORAL 3 TIMES DAILY
Qty: 90 CAPSULE | Refills: 0 | Status: SHIPPED | OUTPATIENT
Start: 2024-12-16 | End: 2025-01-15

## 2024-12-16 RX ORDER — OXYCODONE AND ACETAMINOPHEN 5; 325 MG/1; MG/1
1 TABLET ORAL DAILY PRN
Qty: 7 TABLET | Refills: 0 | Status: SHIPPED | OUTPATIENT
Start: 2024-12-16 | End: 2024-12-23

## 2024-12-16 RX ORDER — ASPIRIN 81 MG/1
81 TABLET ORAL 2 TIMES DAILY
Qty: 60 TABLET | Refills: 2 | Status: SHIPPED | OUTPATIENT
Start: 2024-12-16 | End: 2025-03-16

## 2024-12-16 NOTE — PROGRESS NOTES
Chief Complaint   Patient presents with    Post-Op Check     6wk PO R Trimall ORIF. Pt ambulating in wheelchair with CAM boot. Pt states intermittent pain.         OP:SURGEON: Dr. Juanpablo Maier DO  DATE OF PROCEDURE: 11/4/24  PROCEDURE:  Right ankle trimalar fracture open reduction with internal fixation medial lateral malleolus  Dynamic fluoroscopic evaluation right ankle    POD: 6 weeks    Subjective:  Angelica Rey is following up from the above surgery. She is NWB on right lower extremity. She ambulates with assistive device, walker.  Pain to extremity is moderate and is  taking prescribed pain medication, Percocet 5/325 mg. They denies numbness, tingling to the right lower extremity. Denies calf pain, chest pain, or shortness of breath.  Patient continues to use DVT prophylaxis, ASA 81 mg BID. She does report burning to her foot from time to time.      Review of Systems -  All pertinent positives/negative in HPI     Objective:    General: Alert and oriented X 3, normocephalic atraumatic, external ears and eye normal, sclera clear, no acute distress, respirations easy and unlabored with no audible wheezes, skin warm and dry, speech and dress appropriate for noted age, affect euthymic.    Extremity:  Right Lower Extremity  Skin clean dry and intact, without signs of infection  Incision healing well with no erythema or signs of infection  moderate edema noted  Compartments supple throughout thigh and leg  Calf supple and not tender  negative Homans  Demonstrates active knee flexion and extension and active ankle DF/PF.  States sensation intact to touch in sural, deep peroneal, superficial peroneal, saphenous, posterior tibial  nerve distributions to foot/ankle.  Palpable dorsalis pedis and posterior tibialis pulses, cap refill brisk in toes, foot warm/perfused.      /78 (Site: Left Upper Arm, Position: Sitting, Cuff Size: Medium Adult)   Pulse 85   Temp 98.1 °F (36.7 °C)   Resp 18   SpO2 97%     XR:   Xr

## 2024-12-16 NOTE — PATIENT INSTRUCTIONS
WB:  Non-weight bearing on right lower extremity    Walking Boot: On at all times, can remove for bathing and therapy. Remove daily for evaluation of skin breakdown     DVT: Continue with ASA 81 mg BID as ordered  Pain control : Over the counter analgesia as needed    Continue with ice to the injured extremity 2-3 times per day for swelling  If able continue with elevation and compression    Follow up in 6 weeks with XR of the right ankle to be done in office

## 2025-01-15 ENCOUNTER — TELEPHONE (OUTPATIENT)
Dept: ORTHOPEDIC SURGERY | Age: 60
End: 2025-01-15

## 2025-01-15 NOTE — TELEPHONE ENCOUNTER
Pt called. States for the past week, the swelling won't go down to R ankle.  Denies any injury, no redness or warmth.  Pt states she is doing nothing different. She is elevating and using ice at night, but that is not decreasing the edema. Elevating in recliner during the day, but that is nothing different than she has been doing. States ankle feels tight and tingling d/t swelling. She is just concerned because previously the swelling would go down with ice and elevation and now it is not.

## 2025-01-15 NOTE — TELEPHONE ENCOUNTER
Spoke to pt. She has no calf pain or swelling, it is only in her ankle. She continue to take ASA as ordered. Recommended urgent care if it is bothering her a lot. She states she will just wait until her follow up visit next week. Instructed her to go to the ED with any calf pain, tenderness, or swelling. Verbalized understanding.

## 2025-01-17 DIAGNOSIS — S82.891A CLOSED FRACTURE OF RIGHT ANKLE, INITIAL ENCOUNTER: Primary | ICD-10-CM

## 2025-01-23 ENCOUNTER — OFFICE VISIT (OUTPATIENT)
Dept: ORTHOPEDIC SURGERY | Age: 60
End: 2025-01-23
Payer: COMMERCIAL

## 2025-01-23 ENCOUNTER — HOSPITAL ENCOUNTER (OUTPATIENT)
Dept: GENERAL RADIOLOGY | Age: 60
Discharge: HOME | End: 2025-01-25
Payer: COMMERCIAL

## 2025-01-23 VITALS
OXYGEN SATURATION: 96 % | RESPIRATION RATE: 16 BRPM | HEART RATE: 99 BPM | SYSTOLIC BLOOD PRESSURE: 140 MMHG | DIASTOLIC BLOOD PRESSURE: 80 MMHG

## 2025-01-23 DIAGNOSIS — S82.891A CLOSED FRACTURE OF RIGHT ANKLE, INITIAL ENCOUNTER: Primary | ICD-10-CM

## 2025-01-23 DIAGNOSIS — S82.891A CLOSED FRACTURE OF RIGHT ANKLE, INITIAL ENCOUNTER: ICD-10-CM

## 2025-01-23 PROCEDURE — 73610 X-RAY EXAM OF ANKLE: CPT

## 2025-01-23 PROCEDURE — 99212 OFFICE O/P EST SF 10 MIN: CPT | Performed by: ORTHOPAEDIC SURGERY

## 2025-01-23 PROCEDURE — 99024 POSTOP FOLLOW-UP VISIT: CPT | Performed by: ORTHOPAEDIC SURGERY

## 2025-01-23 NOTE — PROGRESS NOTES
Ortho Clinic Note    Chief Complaint   Patient presents with    Post-Op Check     11wk PO R Trimall ORIF. Pt ambulating in wheelchair with CAM boot. Pt states intermittent pain with swelling.      OP:SURGEON: Dr. Juanpablo Maier DO  DATE OF PROCEDURE: 11/4/24  PROCEDURE:  Right ankle trimalar fracture open reduction with internal fixation medial lateral malleolus  Dynamic fluoroscopic evaluation right ankle    Subjective:  Angelica Rey is following up from the above surgery. She is NWB on right lower extremity. She ambulates with assistive device, walker.  Denies any significant pain.  Patient has yet to start formal physical therapy.  States has been trying to do some ROM modalities at home however is not made much leeway.  Still has a walking boot which she wears.  States she is a difficult time getting her heel down in the boot.  Denies calf pain, chest pain, or shortness of breath.  Patient continues to use DVT prophylaxis, ASA 81 mg BID.  States her swelling is much improved.     Review of Systems -  All pertinent positives/negative in HPI     Objective:    General: Alert and oriented X 3, normocephalic atraumatic, external ears and eye normal, sclera clear, no acute distress, respirations easy and unlabored with no audible wheezes, skin warm and dry, speech and dress appropriate for noted age, affect euthymic.    Extremity:  Right Lower Extremity  Incisions healing well with no erythema or signs of infection.  Hardware is palpable although nontender, no skin tenting or compromise  Very minimal residual edema today  Demonstrates limited active ROM of ankle, -10 degrees dorsiflexion, 20 degrees plantarflexion, can passively correct to neutral dorsiflexion  Compartments supple throughout thigh and leg  Calf supple and not tender, negative Homans  Demonstrates active knee flexion and extension and active ankle DF/PF.  States sensation intact to touch in sural, deep peroneal, superficial peroneal, saphenous,

## 2025-03-04 DIAGNOSIS — S82.891A CLOSED FRACTURE OF RIGHT ANKLE, INITIAL ENCOUNTER: Primary | ICD-10-CM

## 2025-03-06 ENCOUNTER — OFFICE VISIT (OUTPATIENT)
Dept: ORTHOPEDIC SURGERY | Age: 60
End: 2025-03-06
Payer: COMMERCIAL

## 2025-03-06 ENCOUNTER — HOSPITAL ENCOUNTER (OUTPATIENT)
Dept: GENERAL RADIOLOGY | Age: 60
Discharge: HOME | End: 2025-03-08
Payer: COMMERCIAL

## 2025-03-06 VITALS
HEART RATE: 76 BPM | TEMPERATURE: 97.6 F | HEIGHT: 64 IN | DIASTOLIC BLOOD PRESSURE: 110 MMHG | RESPIRATION RATE: 20 BRPM | BODY MASS INDEX: 41.48 KG/M2 | WEIGHT: 243 LBS | SYSTOLIC BLOOD PRESSURE: 200 MMHG | OXYGEN SATURATION: 98 %

## 2025-03-06 DIAGNOSIS — S82.891A CLOSED FRACTURE OF RIGHT ANKLE, INITIAL ENCOUNTER: Primary | ICD-10-CM

## 2025-03-06 DIAGNOSIS — S82.891A CLOSED FRACTURE OF RIGHT ANKLE, INITIAL ENCOUNTER: ICD-10-CM

## 2025-03-06 PROCEDURE — 3080F DIAST BP >= 90 MM HG: CPT | Performed by: ORTHOPAEDIC SURGERY

## 2025-03-06 PROCEDURE — 73610 X-RAY EXAM OF ANKLE: CPT

## 2025-03-06 PROCEDURE — 99212 OFFICE O/P EST SF 10 MIN: CPT | Performed by: ORTHOPAEDIC SURGERY

## 2025-03-06 PROCEDURE — 3077F SYST BP >= 140 MM HG: CPT | Performed by: ORTHOPAEDIC SURGERY

## 2025-03-06 PROCEDURE — 99214 OFFICE O/P EST MOD 30 MIN: CPT | Performed by: ORTHOPAEDIC SURGERY

## 2025-03-06 RX ORDER — OXYCODONE AND ACETAMINOPHEN 5; 325 MG/1; MG/1
1 TABLET ORAL EVERY 8 HOURS PRN
Qty: 21 TABLET | Refills: 0 | Status: SHIPPED | OUTPATIENT
Start: 2025-03-06 | End: 2025-03-13

## 2025-03-06 NOTE — PROGRESS NOTES
DF/PF.  States sensation intact to touch in sural, deep peroneal, superficial peroneal, saphenous, posterior tibial  nerve distributions to foot/ankle.  Palpable dorsalis pedis and posterior tibialis pulses, cap refill brisk in toes, foot warm/perfused.      BP (!) 200/110   Pulse 76   Temp 97.6 °F (36.4 °C) (Temporal)   Resp 20   Ht 1.626 m (5' 4.02\")   Wt 110.2 kg (243 lb)   SpO2 98%   BMI 41.69 kg/m²     XR:   Right ankle status post medial lateral malleolus ORIF with plate and screw construct, hardware remains intact, mortise is aligned, fractures do appear to be healing.    Assessment:   Diagnosis Orders   1. Closed fracture of right ankle, initial encounter            Plan:  Xrays reviewed with patient  We again discussed her significant stiffness about the ankle  Discussion needs to be more aggressive with home exercise program with therapy, updated therapy prescription provided today  Patient is weightbearing as tolerated  Demonstrate specific exercises to help improve her dorsiflexion ROM  Percocet prescription sent to her pharmacy to try to help ease her discomfort in order to allow her to progress with therapy  We did discuss potential hardware removal in the future should she still have continued issue with the medial plate and screws.   Patient to follow-up in office in 2 months for repeat evaluation, repeat x-rays next visit    Electronically signed by DANIELLE ARDON DO on 3/6/2025     Note: This report was completed using [employer] voiced recognition software.  Every effort has been made to ensure accuracy; however, inadvertent computerized transcription errors may be present.

## 2025-05-06 DIAGNOSIS — S82.891A CLOSED FRACTURE OF RIGHT ANKLE, INITIAL ENCOUNTER: Primary | ICD-10-CM

## 2025-05-08 ENCOUNTER — HOSPITAL ENCOUNTER (OUTPATIENT)
Dept: GENERAL RADIOLOGY | Age: 60
Discharge: HOME | End: 2025-05-10
Payer: COMMERCIAL

## 2025-05-08 ENCOUNTER — OFFICE VISIT (OUTPATIENT)
Dept: ORTHOPEDIC SURGERY | Age: 60
End: 2025-05-08
Payer: COMMERCIAL

## 2025-05-08 VITALS
HEART RATE: 94 BPM | OXYGEN SATURATION: 100 % | SYSTOLIC BLOOD PRESSURE: 126 MMHG | DIASTOLIC BLOOD PRESSURE: 84 MMHG | TEMPERATURE: 98 F | RESPIRATION RATE: 16 BRPM

## 2025-05-08 DIAGNOSIS — S82.891A CLOSED FRACTURE OF RIGHT ANKLE, INITIAL ENCOUNTER: ICD-10-CM

## 2025-05-08 DIAGNOSIS — S82.891D CLOSED FRACTURE OF RIGHT ANKLE WITH ROUTINE HEALING, SUBSEQUENT ENCOUNTER: Primary | ICD-10-CM

## 2025-05-08 PROCEDURE — 3079F DIAST BP 80-89 MM HG: CPT | Performed by: PHYSICIAN ASSISTANT

## 2025-05-08 PROCEDURE — 73610 X-RAY EXAM OF ANKLE: CPT

## 2025-05-08 PROCEDURE — 99213 OFFICE O/P EST LOW 20 MIN: CPT | Performed by: PHYSICIAN ASSISTANT

## 2025-05-08 PROCEDURE — 99212 OFFICE O/P EST SF 10 MIN: CPT | Performed by: PHYSICIAN ASSISTANT

## 2025-05-08 PROCEDURE — 3074F SYST BP LT 130 MM HG: CPT | Performed by: PHYSICIAN ASSISTANT

## 2025-05-08 RX ORDER — AMLODIPINE BESYLATE 5 MG/1
5 TABLET ORAL DAILY
COMMUNITY

## 2025-05-08 NOTE — PROGRESS NOTES
Chief Complaint   Patient presents with    Follow-up     Right Trimalleolar Ankle Fracture ORIF 11/4/24. Ambulating without assist.  States she has no pain, but has discomfort       OP:SURGEON: Dr. Juanpablo Maier DO  DATE OF PROCEDURE: 11/4/24  PROCEDURE:  Right ankle trimalar fracture open reduction with internal fixation medial lateral malleolus  Dynamic fluoroscopic evaluation right ankle    Subjective:  Angelica Rey is approximately 6 months from the above surgery.  States that she still has discomfort to medial lateral ankle with weightbearing tibia, but has minimal issues at rest.  Wears compression socks and has some mild edema with increased activity and increased weightbearing.  She is full weightbearing bilateral lower extremities without assistive devices.  She has finished physical therapy, but states that she went 3 months from surgery before even going to therapy so feels as though she was behind from the start.  States that she is compliant with home exercise plan directed by physical therapy.     Review of Systems -  all pertinent positives and negatives in HPI.      Objective:    General: Alert and oriented X 3, normocephalic atraumatic, external ears and eye normal, sclera clear, no acute distress, respirations easy and unlabored with no audible wheezes, skin warm and dry, speech and dress appropriate for noted age, affect euthymic.    Extremity:  Right Lower Extremity  Skin clean dry and intact, without signs of infection  Incision is healed  Mild edema throughout the ankle  Active dorsiflexion to 5, plantarflexion to 20-25  Compartments supple throughout thigh and leg  Calf supple and nontender  Demonstrates active knee flexion/extension, ankle plantar/dorsiflexion/great toe extension.   States sensation intact to touch in sural/deep peroneal/superficial peroneal/saphenous/posterior tibial nerve distributions to foot/ankle.   Palpable dorsalis pedis and posterior tibialis pulses, cap refill brisk

## 2025-08-21 ENCOUNTER — OFFICE VISIT (OUTPATIENT)
Age: 60
End: 2025-08-21
Payer: COMMERCIAL

## 2025-08-21 ENCOUNTER — HOSPITAL ENCOUNTER (OUTPATIENT)
Dept: GENERAL RADIOLOGY | Age: 60
Discharge: HOME | End: 2025-08-23
Payer: COMMERCIAL

## 2025-08-21 VITALS
DIASTOLIC BLOOD PRESSURE: 93 MMHG | OXYGEN SATURATION: 100 % | RESPIRATION RATE: 20 BRPM | HEART RATE: 83 BPM | SYSTOLIC BLOOD PRESSURE: 147 MMHG | TEMPERATURE: 97.4 F

## 2025-08-21 DIAGNOSIS — S82.891D CLOSED FRACTURE OF RIGHT ANKLE WITH ROUTINE HEALING, SUBSEQUENT ENCOUNTER: ICD-10-CM

## 2025-08-21 DIAGNOSIS — S82.891D CLOSED FRACTURE OF RIGHT ANKLE WITH ROUTINE HEALING, SUBSEQUENT ENCOUNTER: Primary | ICD-10-CM

## 2025-08-21 PROCEDURE — 3077F SYST BP >= 140 MM HG: CPT | Performed by: ORTHOPAEDIC SURGERY

## 2025-08-21 PROCEDURE — 73610 X-RAY EXAM OF ANKLE: CPT

## 2025-08-21 PROCEDURE — 3080F DIAST BP >= 90 MM HG: CPT | Performed by: ORTHOPAEDIC SURGERY

## 2025-08-21 PROCEDURE — 99213 OFFICE O/P EST LOW 20 MIN: CPT | Performed by: ORTHOPAEDIC SURGERY

## (undated) DEVICE — STRAP POS MP 30X3 IN HK LOOP CLOSURE FOAM DISP

## (undated) DEVICE — SYRINGE MED 10ML TRNSLUC BRL PLUNG BLK MRK POLYPR CTRL

## (undated) DEVICE — Device

## (undated) DEVICE — DRESSING,GAUZE,XEROFORM,CURAD,1"X8",ST: Brand: CURAD

## (undated) DEVICE — DRAPE EQUIP CARM 72X42 IN RUBBER BND CLP

## (undated) DEVICE — ELECTRODE PT RET AD L9FT HI MOIST COND ADH HYDRGEL CORDED

## (undated) DEVICE — BIT DRL L112MM DIA3.5MM ST QUIK CPL NONRADIOPAQUE W/O STP

## (undated) DEVICE — DRAPE C ARM W41XL74IN UNIV MOB W RUBBERBAND CLP

## (undated) DEVICE — LOWER EXT KNEE DRAPE: Brand: MEDLINE INDUSTRIES, INC.

## (undated) DEVICE — BIT DRL L110MM DIA2.5MM ST G QUIK CPL NONRADIOPAQUE W/O STP